# Patient Record
Sex: FEMALE | Race: WHITE | NOT HISPANIC OR LATINO | Employment: OTHER | URBAN - METROPOLITAN AREA
[De-identification: names, ages, dates, MRNs, and addresses within clinical notes are randomized per-mention and may not be internally consistent; named-entity substitution may affect disease eponyms.]

---

## 2018-04-30 ENCOUNTER — TRANSCRIBE ORDERS (OUTPATIENT)
Dept: ADMINISTRATIVE | Facility: HOSPITAL | Age: 63
End: 2018-04-30

## 2019-02-13 ENCOUNTER — HOSPITAL ENCOUNTER (OUTPATIENT)
Dept: LAB | Facility: HOSPITAL | Age: 64
Discharge: HOME | End: 2019-02-13
Attending: INTERNAL MEDICINE

## 2019-02-13 LAB
25(OH)D3 SERPL-MCNC: 40 NG/ML (ref 30–100)
ALBUMIN SERPL-MCNC: 4.1 G/DL (ref 3.5–5)
ANION GAP SERPL CALC-SCNC: 19 MMOL/L (ref 8–19)
APPEARANCE UR: CLEAR
BASOPHILS # BLD AUTO: 0.11 10*3/UL (ref 0–0.2)
BASOPHILS NFR BLD AUTO: 1 % (ref 0–3)
BILIRUB UR QL: NEGATIVE
BUN SERPL-MCNC: 23 MG/DL (ref 5–25)
BUN/CREAT SERPL: 17 {RATIO} (ref 6–20)
CALCIUM SERPL-MCNC: 10.1 MG/DL (ref 8.7–10.4)
CHLORIDE SERPL-SCNC: 102 MMOL/L (ref 99–111)
COLOR UR: YELLOW
CONV BACTERIA: ABNORMAL
CONV CO2: 23 MMOL/L (ref 22–32)
CONV COLLECTION SOURCE (UA): ABNORMAL
CONV CREATININE URINE, RANDOM: 205.5 MG/DL (ref 10–300)
CONV CRYSTALS: ABNORMAL /[HPF]
CONV HYALINE CASTS IN URINE MICRO: ABNORMAL /[LPF]
CONV UROBILINOGEN IN URINE BY AUTOMATED TEST STRIP: 1 {EHRLICHU}/DL (ref 0.1–1)
CREAT UR-MCNC: 1.38 MG/DL (ref 0.5–0.9)
EOSINOPHIL # BLD AUTO: 0.3 10*3/UL (ref 0–0.7)
EOSINOPHIL # BLD AUTO: 2.68 % (ref 0–7)
ERYTHROCYTE [DISTWIDTH] IN BLOOD BY AUTOMATED COUNT: 12.2 % (ref 11.5–14.5)
GFR SERPLBLD BASED ON 1.73 SQ M-ARVRAT: 40 ML/MIN/{1.73_M2}
GLUCOSE SERPL-MCNC: 124 MG/DL (ref 65–99)
GLUCOSE UR QL: NEGATIVE MG/DL
HBA1C MFR BLD: 14.1 G/DL (ref 12–16)
HCT VFR BLD AUTO: 40.9 % (ref 37–47)
HGB UR QL STRIP: NEGATIVE
KETONES UR QL STRIP: NEGATIVE MG/DL
LEUKOCYTE ESTERASE UR QL STRIP: NEGATIVE
LYMPHOCYTES # BLD AUTO: 4.04 10*3/UL (ref 1–5)
MCH RBC QN AUTO: 29.3 PG (ref 27–31)
MCHC RBC AUTO-ENTMCNC: 34.5 G/DL (ref 33–37)
MCV RBC AUTO: 85 FL (ref 81–99)
MONOCYTES # BLD AUTO: 0.96 10*3/UL (ref 0.2–1.2)
MONOCYTES NFR BLD AUTO: 8.74 % (ref 3–10)
NEUTROPHILS # BLD AUTO: 5.63 10*3/UL (ref 2–8)
NEUTROPHILS NFR BLD AUTO: 51 % (ref 30–85)
NITRITE UR QL STRIP: POSITIVE
NRBC BLD AUTO-RTO: 0 % (ref 0–0.01)
PH UR STRIP.AUTO: 5.5 [PH] (ref 5–8)
PHOSPHATE SERPL-MCNC: 3.7 MG/DL (ref 2.4–4.5)
PLATELET # BLD AUTO: 258 10*3/UL (ref 130–400)
PMV BLD AUTO: 6.7 FL (ref 7.4–10.4)
POTASSIUM SERPL-SCNC: 4.3 MMOL/L (ref 3.5–5.3)
PROT UR QL: NEGATIVE MG/DL
PROT UR-MCNC: 23.9 MG/DL
PTH-INTACT SERPL-MCNC: 33.9 PG/ML (ref 11.1–79.5)
RBC # BLD AUTO: 4.81 10*6/UL (ref 4.2–5.4)
RBC #/AREA URNS HPF: ABNORMAL /[HPF]
SODIUM SERPL-SCNC: 140 MMOL/L (ref 135–147)
SP GR UR: 1.02 (ref 1–1.03)
SQUAMOUS SPT QL MICRO: ABNORMAL /[HPF]
VARIANT LYMPHS NFR BLD MANUAL: 36.6 % (ref 20–45)
WBC # BLD AUTO: 11 10*3/UL (ref 4.8–10.8)
WBC #/AREA URNS HPF: ABNORMAL /[HPF]

## 2020-04-22 ENCOUNTER — HOSPITAL ENCOUNTER (OUTPATIENT)
Dept: LAB | Facility: HOSPITAL | Age: 65
Discharge: HOME | End: 2020-04-22
Attending: NURSE PRACTITIONER

## 2020-04-22 LAB
25(OH)D3 SERPL-MCNC: 32.1 NG/ML (ref 30–100)
ALBUMIN SERPL-MCNC: 4.2 G/DL (ref 3.5–5)
ANION GAP SERPL CALC-SCNC: 21 MMOL/L (ref 8–19)
APPEARANCE UR: ABNORMAL
BASOPHILS # BLD AUTO: 0.08 10*3/UL (ref 0–0.2)
BASOPHILS NFR BLD AUTO: 0.8 % (ref 0–3)
BILIRUB UR QL: NEGATIVE
BUN SERPL-MCNC: 15 MG/DL (ref 5–25)
BUN/CREAT SERPL: 11 {RATIO} (ref 6–20)
CALCIUM SERPL-MCNC: 9.6 MG/DL (ref 8.7–10.4)
CHLORIDE SERPL-SCNC: 101 MMOL/L (ref 99–111)
COLOR UR: YELLOW
CONV ABS IMM GRAN: 0.05 10*3/UL (ref 0–0.2)
CONV BACTERIA: ABNORMAL
CONV CO2: 23 MMOL/L (ref 22–32)
CONV COLLECTION SOURCE (UA): ABNORMAL
CONV CREATININE URINE, RANDOM: 247.1 MG/DL (ref 10–300)
CONV CRYSTALS: ABNORMAL /[HPF]
CONV IMMATURE GRAN: 0.5 % (ref 0–1.8)
CONV PROTEIN TO CREATININE RATIO (RANDOM URINE): 0.16 {RATIO} (ref 0–0.1)
CONV UROBILINOGEN IN URINE BY AUTOMATED TEST STRIP: 1 {EHRLICHU}/DL (ref 0.1–1)
CREAT UR-MCNC: 1.35 MG/DL (ref 0.5–0.9)
DEPRECATED RDW RBC AUTO: 43.5 FL (ref 36.4–46.3)
EOSINOPHIL # BLD AUTO: 0.37 10*3/UL (ref 0–0.7)
EOSINOPHIL # BLD AUTO: 3.5 % (ref 0–7)
ERYTHROCYTE [DISTWIDTH] IN BLOOD BY AUTOMATED COUNT: 13.2 % (ref 11.7–14.4)
GFR SERPLBLD BASED ON 1.73 SQ M-ARVRAT: 41 ML/MIN/{1.73_M2}
GLUCOSE SERPL-MCNC: 96 MG/DL (ref 65–99)
GLUCOSE UR QL: NEGATIVE MG/DL
HCT VFR BLD AUTO: 44.4 % (ref 37–47)
HGB BLD-MCNC: 14.1 G/DL (ref 12–16)
HGB UR QL STRIP: NEGATIVE
KETONES UR QL STRIP: NEGATIVE MG/DL
LEUKOCYTE ESTERASE UR QL STRIP: NEGATIVE
LYMPHOCYTES # BLD AUTO: 5.38 10*3/UL (ref 1–5)
LYMPHOCYTES NFR BLD AUTO: 51.3 % (ref 20–45)
MCH RBC QN AUTO: 28.5 PG (ref 27–31)
MCHC RBC AUTO-ENTMCNC: 31.8 G/DL (ref 33–37)
MCV RBC AUTO: 89.7 FL (ref 81–99)
MONOCYTES # BLD AUTO: 0.99 10*3/UL (ref 0.2–1.2)
MONOCYTES NFR BLD AUTO: 9.4 % (ref 3–10)
NEUTROPHILS # BLD AUTO: 3.62 10*3/UL (ref 2–8)
NEUTROPHILS NFR BLD AUTO: 34.5 % (ref 30–85)
NITRITE UR QL STRIP: NEGATIVE
NRBC CBCN: 0 % (ref 0–0.7)
PH UR STRIP.AUTO: 5 [PH] (ref 5–8)
PHOSPHATE SERPL-MCNC: 3.8 MG/DL (ref 2.4–4.5)
PLATELET # BLD AUTO: 288 10*3/UL (ref 130–400)
PMV BLD AUTO: 9.2 FL (ref 9.4–12.3)
POTASSIUM SERPL-SCNC: 3.9 MMOL/L (ref 3.5–5.3)
PROT UR QL: NEGATIVE MG/DL
PROT UR-MCNC: 38.7 MG/DL
PTH-INTACT SERPL-MCNC: 61 PG/ML (ref 11.1–79.5)
RBC # BLD AUTO: 4.95 10*6/UL (ref 4.2–5.4)
RBC #/AREA URNS HPF: ABNORMAL /[HPF]
SODIUM SERPL-SCNC: 141 MMOL/L (ref 135–147)
SP GR UR: 1.02 (ref 1–1.03)
SQUAMOUS SPT QL MICRO: ABNORMAL /[HPF]
WBC # BLD AUTO: 10.49 10*3/UL (ref 4.8–10.8)
WBC #/AREA URNS HPF: ABNORMAL /[HPF]

## 2021-03-19 ENCOUNTER — HOSPITAL ENCOUNTER (OUTPATIENT)
Dept: VACCINE CLINIC | Facility: HOSPITAL | Age: 66
Discharge: HOME | End: 2021-03-19
Attending: INTERNAL MEDICINE

## 2021-04-16 ENCOUNTER — HOSPITAL ENCOUNTER (OUTPATIENT)
Dept: VACCINE CLINIC | Facility: HOSPITAL | Age: 66
Discharge: HOME | End: 2021-04-16
Attending: INTERNAL MEDICINE

## 2021-07-27 ENCOUNTER — LAB (OUTPATIENT)
Dept: LAB | Facility: HOSPITAL | Age: 66
End: 2021-07-27

## 2021-07-27 ENCOUNTER — TRANSCRIBE ORDERS (OUTPATIENT)
Dept: LAB | Facility: HOSPITAL | Age: 66
End: 2021-07-27

## 2021-07-27 DIAGNOSIS — E55.9 VITAMIN D DEFICIENCY: ICD-10-CM

## 2021-07-27 DIAGNOSIS — N18.31 STAGE 3A CHRONIC KIDNEY DISEASE (HCC): Primary | ICD-10-CM

## 2021-07-27 DIAGNOSIS — N18.31 STAGE 3A CHRONIC KIDNEY DISEASE (HCC): ICD-10-CM

## 2021-07-27 LAB
25(OH)D3 SERPL-MCNC: 48.9 NG/ML (ref 30–100)
ALBUMIN SERPL-MCNC: 4.2 G/DL (ref 3.5–5.2)
ANION GAP SERPL CALCULATED.3IONS-SCNC: 12.4 MMOL/L (ref 5–15)
BACTERIA UR QL AUTO: ABNORMAL /HPF
BASOPHILS # BLD AUTO: 0.05 10*3/MM3 (ref 0–0.2)
BASOPHILS NFR BLD AUTO: 0.6 % (ref 0–1.5)
BILIRUB UR QL STRIP: NEGATIVE
BUN SERPL-MCNC: 16 MG/DL (ref 8–23)
BUN/CREAT SERPL: 12.1 (ref 7–25)
CALCIUM SPEC-SCNC: 9.5 MG/DL (ref 8.6–10.5)
CHLORIDE SERPL-SCNC: 104 MMOL/L (ref 98–107)
CLARITY UR: ABNORMAL
CO2 SERPL-SCNC: 21.6 MMOL/L (ref 22–29)
COLOR UR: YELLOW
CREAT SERPL-MCNC: 1.32 MG/DL (ref 0.57–1)
CREAT UR-MCNC: 252.6 MG/DL
DEPRECATED RDW RBC AUTO: 44.6 FL (ref 37–54)
EOSINOPHIL # BLD AUTO: 0.26 10*3/MM3 (ref 0–0.4)
EOSINOPHIL NFR BLD AUTO: 2.9 % (ref 0.3–6.2)
ERYTHROCYTE [DISTWIDTH] IN BLOOD BY AUTOMATED COUNT: 13.7 % (ref 12.3–15.4)
GFR SERPL CREATININE-BSD FRML MDRD: 40 ML/MIN/1.73
GLUCOSE SERPL-MCNC: 138 MG/DL (ref 65–99)
GLUCOSE UR STRIP-MCNC: NEGATIVE MG/DL
HCT VFR BLD AUTO: 41.7 % (ref 34–46.6)
HGB BLD-MCNC: 13.8 G/DL (ref 12–15.9)
HGB UR QL STRIP.AUTO: NEGATIVE
HYALINE CASTS UR QL AUTO: ABNORMAL /LPF
IMM GRANULOCYTES # BLD AUTO: 0.03 10*3/MM3 (ref 0–0.05)
IMM GRANULOCYTES NFR BLD AUTO: 0.3 % (ref 0–0.5)
KETONES UR QL STRIP: NEGATIVE
LEUKOCYTE ESTERASE UR QL STRIP.AUTO: NEGATIVE
LYMPHOCYTES # BLD AUTO: 3.76 10*3/MM3 (ref 0.7–3.1)
LYMPHOCYTES NFR BLD AUTO: 41.4 % (ref 19.6–45.3)
MCH RBC QN AUTO: 29.2 PG (ref 26.6–33)
MCHC RBC AUTO-ENTMCNC: 33.1 G/DL (ref 31.5–35.7)
MCV RBC AUTO: 88.2 FL (ref 79–97)
MONOCYTES # BLD AUTO: 0.68 10*3/MM3 (ref 0.1–0.9)
MONOCYTES NFR BLD AUTO: 7.5 % (ref 5–12)
NEUTROPHILS NFR BLD AUTO: 4.31 10*3/MM3 (ref 1.7–7)
NEUTROPHILS NFR BLD AUTO: 47.3 % (ref 42.7–76)
NITRITE UR QL STRIP: NEGATIVE
NRBC BLD AUTO-RTO: 0 /100 WBC (ref 0–0.2)
PH UR STRIP.AUTO: 6 [PH] (ref 5–8)
PHOSPHATE SERPL-MCNC: 2.8 MG/DL (ref 2.5–4.5)
PLATELET # BLD AUTO: 291 10*3/MM3 (ref 140–450)
PMV BLD AUTO: 9.5 FL (ref 6–12)
POTASSIUM SERPL-SCNC: 4.5 MMOL/L (ref 3.5–5.2)
PROT UR QL STRIP: ABNORMAL
PROT UR-MCNC: 77.5 MG/DL
PROT/CREAT UR: 0.3 MG/G{CREAT}
PTH-INTACT SERPL-MCNC: 63.2 PG/ML (ref 15–65)
RBC # BLD AUTO: 4.73 10*6/MM3 (ref 3.77–5.28)
RBC # UR: ABNORMAL /HPF
REF LAB TEST METHOD: ABNORMAL
SODIUM SERPL-SCNC: 138 MMOL/L (ref 136–145)
SP GR UR STRIP: 1.03 (ref 1–1.03)
SQUAMOUS #/AREA URNS HPF: ABNORMAL /HPF
UROBILINOGEN UR QL STRIP: ABNORMAL
WBC # BLD AUTO: 9.09 10*3/MM3 (ref 3.4–10.8)
WBC UR QL AUTO: ABNORMAL /HPF

## 2021-07-27 PROCEDURE — 82570 ASSAY OF URINE CREATININE: CPT

## 2021-07-27 PROCEDURE — 81001 URINALYSIS AUTO W/SCOPE: CPT

## 2021-07-27 PROCEDURE — 84156 ASSAY OF PROTEIN URINE: CPT

## 2021-07-27 PROCEDURE — 36415 COLL VENOUS BLD VENIPUNCTURE: CPT

## 2021-07-27 PROCEDURE — 80069 RENAL FUNCTION PANEL: CPT

## 2021-07-27 PROCEDURE — 85025 COMPLETE CBC W/AUTO DIFF WBC: CPT

## 2021-07-27 PROCEDURE — 83970 ASSAY OF PARATHORMONE: CPT

## 2021-07-27 PROCEDURE — 82306 VITAMIN D 25 HYDROXY: CPT

## 2021-08-06 ENCOUNTER — TRANSCRIBE ORDERS (OUTPATIENT)
Dept: ADMINISTRATIVE | Facility: HOSPITAL | Age: 66
End: 2021-08-06

## 2021-08-06 DIAGNOSIS — Z12.31 VISIT FOR SCREENING MAMMOGRAM: Primary | ICD-10-CM

## 2021-08-09 ENCOUNTER — HOSPITAL ENCOUNTER (OUTPATIENT)
Dept: MAMMOGRAPHY | Facility: HOSPITAL | Age: 66
Discharge: HOME | End: 2021-08-09

## 2021-08-09 ENCOUNTER — HOSPITAL ENCOUNTER (OUTPATIENT)
Dept: ULTRASOUND IMAGING | Facility: HOSPITAL | Age: 66
Discharge: HOME | End: 2021-08-09

## 2021-08-09 ENCOUNTER — TRANSCRIBE ORDERS (OUTPATIENT)
Dept: ADMINISTRATIVE | Facility: HOSPITAL | Age: 66
End: 2021-08-09

## 2021-08-09 DIAGNOSIS — Z12.31 VISIT FOR SCREENING MAMMOGRAM: ICD-10-CM

## 2021-08-09 PROCEDURE — G0279 TOMOSYNTHESIS, MAMMO: HCPCS

## 2021-08-09 PROCEDURE — 77066 DX MAMMO INCL CAD BI: CPT

## 2021-08-09 PROCEDURE — 76642 ULTRASOUND BREAST LIMITED: CPT

## 2021-08-25 ENCOUNTER — TELEPHONE (OUTPATIENT)
Dept: CARDIOLOGY | Facility: CLINIC | Age: 66
End: 2021-08-25

## 2021-08-27 NOTE — PROGRESS NOTES
RM:________    Referral Provider: No ref. provider found Christi Coppola MD    NEW PATIENT/ CONSULT  PREVIOUS CARDIOLOGIST:   CARDIAC TESTING:     : 1955   AGE: 66 y.o.    2021  REASON FOR VISIT/  CC:      WT: ____________ BP: __________L __________R/ HR_______________    CHEST PAIN: _____________    SOA: ____________PALPS: __________      LIGHTHEADED: ___________ FATIGUE: _______________ EDEMA______________  ALLERGIES:  Patient has no known allergies.  SMOKING HISTORY  Social History     Tobacco Use   • Smoking status: Not on file   Substance Use Topics   • Alcohol use: Not on file   • Drug use: Not on file          CHILDREN: _______________________       CAFFEINE USE________  ALCOHOL _____________  OCCUPATION_____________  No past surgical history on file.             FAMILY HISTORY  HEART DISEASE  CHF  DIABETES  CARDIAC ARREST  STROKE  CANCER  ANEURYSM                                                             H/O: MI_____   STROKE________   GOUT_____   ANEMIA______     CAROTID________ HIV____ CAD_______ HYPERCHOL _____    H/O: CHF _____   RF____ DM___ HTN_______PVD____THYROID DISEASE_______    PMH: GI ____   HEPATITIS ___ KIDNEY DISEASE ___ LUNG DISEASE _______     SLEEP APNEA ____ BLOOD CLOTS ____ DVT ____ VEIN STRIPPING ___________     CANCER _________________________________ CHEMO/ RADIATION__________

## 2021-08-30 ENCOUNTER — TELEPHONE (OUTPATIENT)
Dept: CARDIOLOGY | Facility: CLINIC | Age: 66
End: 2021-08-30

## 2021-08-30 ENCOUNTER — HOSPITAL ENCOUNTER (INPATIENT)
Facility: HOSPITAL | Age: 66
LOS: 1 days | Discharge: HOME | End: 2021-08-31
Attending: INTERNAL MEDICINE | Admitting: INTERNAL MEDICINE

## 2021-08-30 ENCOUNTER — OFFICE VISIT (OUTPATIENT)
Dept: CARDIOLOGY | Facility: CLINIC | Age: 66
End: 2021-08-30

## 2021-08-30 ENCOUNTER — HOSPITAL ENCOUNTER (OUTPATIENT)
Dept: CARDIOLOGY | Facility: HOSPITAL | Age: 66
Discharge: HOME | End: 2021-08-30

## 2021-08-30 VITALS
HEIGHT: 63 IN | DIASTOLIC BLOOD PRESSURE: 84 MMHG | HEART RATE: 81 BPM | BODY MASS INDEX: 47.8 KG/M2 | WEIGHT: 269.8 LBS | SYSTOLIC BLOOD PRESSURE: 130 MMHG

## 2021-08-30 DIAGNOSIS — E78.2 MIXED HYPERLIPIDEMIA: ICD-10-CM

## 2021-08-30 DIAGNOSIS — R06.09 DYSPNEA ON EXERTION: ICD-10-CM

## 2021-08-30 DIAGNOSIS — N18.32 STAGE 3B CHRONIC KIDNEY DISEASE (HCC): ICD-10-CM

## 2021-08-30 DIAGNOSIS — E11.42 TYPE 2 DIABETES MELLITUS WITH DIABETIC POLYNEUROPATHY, WITH LONG-TERM CURRENT USE OF INSULIN (HCC): ICD-10-CM

## 2021-08-30 DIAGNOSIS — R07.2 PRECORDIAL PAIN: ICD-10-CM

## 2021-08-30 DIAGNOSIS — R07.2 PRECORDIAL PAIN: Primary | ICD-10-CM

## 2021-08-30 DIAGNOSIS — I20.0 UNSTABLE ANGINA (HCC): Primary | ICD-10-CM

## 2021-08-30 DIAGNOSIS — Z79.4 TYPE 2 DIABETES MELLITUS WITH DIABETIC POLYNEUROPATHY, WITH LONG-TERM CURRENT USE OF INSULIN (HCC): ICD-10-CM

## 2021-08-30 DIAGNOSIS — E66.01 MORBID OBESITY (HCC): ICD-10-CM

## 2021-08-30 DIAGNOSIS — I10 ESSENTIAL HYPERTENSION: ICD-10-CM

## 2021-08-30 LAB
ALBUMIN SERPL-MCNC: 4.3 G/DL (ref 3.5–5.2)
ALBUMIN/GLOB SERPL: 1.4 G/DL
ALP SERPL-CCNC: 78 U/L (ref 39–117)
ALT SERPL W P-5'-P-CCNC: 16 U/L (ref 1–33)
ANION GAP SERPL CALCULATED.3IONS-SCNC: 12.2 MMOL/L (ref 5–15)
AST SERPL-CCNC: 21 U/L (ref 1–32)
BASOPHILS # BLD AUTO: 0.07 10*3/MM3 (ref 0–0.2)
BASOPHILS NFR BLD AUTO: 0.8 % (ref 0–1.5)
BILIRUB SERPL-MCNC: 0.4 MG/DL (ref 0–1.2)
BUN SERPL-MCNC: 18 MG/DL (ref 8–23)
BUN/CREAT SERPL: 14.4 (ref 7–25)
CALCIUM SPEC-SCNC: 9.5 MG/DL (ref 8.6–10.5)
CHLORIDE SERPL-SCNC: 105 MMOL/L (ref 98–107)
CO2 SERPL-SCNC: 20.8 MMOL/L (ref 22–29)
CREAT SERPL-MCNC: 1.25 MG/DL (ref 0.57–1)
DEPRECATED RDW RBC AUTO: 44.6 FL (ref 37–54)
EOSINOPHIL # BLD AUTO: 0.28 10*3/MM3 (ref 0–0.4)
EOSINOPHIL NFR BLD AUTO: 3.2 % (ref 0.3–6.2)
ERYTHROCYTE [DISTWIDTH] IN BLOOD BY AUTOMATED COUNT: 13.8 % (ref 12.3–15.4)
GFR SERPL CREATININE-BSD FRML MDRD: 43 ML/MIN/1.73
GLOBULIN UR ELPH-MCNC: 3 GM/DL
GLUCOSE BLDC GLUCOMTR-MCNC: 116 MG/DL (ref 70–130)
GLUCOSE BLDC GLUCOMTR-MCNC: 146 MG/DL (ref 70–130)
GLUCOSE SERPL-MCNC: 118 MG/DL (ref 65–99)
HCT VFR BLD AUTO: 45 % (ref 34–46.6)
HGB BLD-MCNC: 14.9 G/DL (ref 12–15.9)
IMM GRANULOCYTES # BLD AUTO: 0.03 10*3/MM3 (ref 0–0.05)
IMM GRANULOCYTES NFR BLD AUTO: 0.3 % (ref 0–0.5)
LYMPHOCYTES # BLD AUTO: 3.75 10*3/MM3 (ref 0.7–3.1)
LYMPHOCYTES NFR BLD AUTO: 42.5 % (ref 19.6–45.3)
MCH RBC QN AUTO: 29.2 PG (ref 26.6–33)
MCHC RBC AUTO-ENTMCNC: 33.1 G/DL (ref 31.5–35.7)
MCV RBC AUTO: 88.1 FL (ref 79–97)
MONOCYTES # BLD AUTO: 0.73 10*3/MM3 (ref 0.1–0.9)
MONOCYTES NFR BLD AUTO: 8.3 % (ref 5–12)
NEUTROPHILS NFR BLD AUTO: 3.96 10*3/MM3 (ref 1.7–7)
NEUTROPHILS NFR BLD AUTO: 44.9 % (ref 42.7–76)
NRBC BLD AUTO-RTO: 0.1 /100 WBC (ref 0–0.2)
PLATELET # BLD AUTO: 219 10*3/MM3 (ref 140–450)
PMV BLD AUTO: 9.4 FL (ref 6–12)
POTASSIUM SERPL-SCNC: 4.5 MMOL/L (ref 3.5–5.2)
PROT SERPL-MCNC: 7.3 G/DL (ref 6–8.5)
QT INTERVAL: 371 MS
RBC # BLD AUTO: 5.11 10*6/MM3 (ref 3.77–5.28)
SARS-COV-2 RNA RESP QL NAA+PROBE: NOT DETECTED
SODIUM SERPL-SCNC: 138 MMOL/L (ref 136–145)
WBC # BLD AUTO: 8.82 10*3/MM3 (ref 3.4–10.8)

## 2021-08-30 PROCEDURE — 25010000002 FENTANYL CITRATE (PF) 50 MCG/ML SOLUTION: Performed by: INTERNAL MEDICINE

## 2021-08-30 PROCEDURE — 80053 COMPREHEN METABOLIC PANEL: CPT | Performed by: INTERNAL MEDICINE

## 2021-08-30 PROCEDURE — 85025 COMPLETE CBC W/AUTO DIFF WBC: CPT | Performed by: INTERNAL MEDICINE

## 2021-08-30 PROCEDURE — C1769 GUIDE WIRE: HCPCS | Performed by: INTERNAL MEDICINE

## 2021-08-30 PROCEDURE — 85347 COAGULATION TIME ACTIVATED: CPT

## 2021-08-30 PROCEDURE — C1887 CATHETER, GUIDING: HCPCS | Performed by: INTERNAL MEDICINE

## 2021-08-30 PROCEDURE — C1874 STENT, COATED/COV W/DEL SYS: HCPCS | Performed by: INTERNAL MEDICINE

## 2021-08-30 PROCEDURE — C9600 PERC DRUG-EL COR STENT SING: HCPCS | Performed by: INTERNAL MEDICINE

## 2021-08-30 PROCEDURE — 99204 OFFICE O/P NEW MOD 45 MIN: CPT | Performed by: INTERNAL MEDICINE

## 2021-08-30 PROCEDURE — 92978 ENDOLUMINL IVUS OCT C 1ST: CPT | Performed by: INTERNAL MEDICINE

## 2021-08-30 PROCEDURE — C1725 CATH, TRANSLUMIN NON-LASER: HCPCS | Performed by: INTERNAL MEDICINE

## 2021-08-30 PROCEDURE — 4A023N7 MEASUREMENT OF CARDIAC SAMPLING AND PRESSURE, LEFT HEART, PERCUTANEOUS APPROACH: ICD-10-PCS | Performed by: INTERNAL MEDICINE

## 2021-08-30 PROCEDURE — 25010000002 HYDRALAZINE PER 20 MG: Performed by: INTERNAL MEDICINE

## 2021-08-30 PROCEDURE — 25010000002 HEPARIN (PORCINE) PER 1000 UNITS: Performed by: INTERNAL MEDICINE

## 2021-08-30 PROCEDURE — 93000 ELECTROCARDIOGRAM COMPLETE: CPT | Performed by: INTERNAL MEDICINE

## 2021-08-30 PROCEDURE — U0003 INFECTIOUS AGENT DETECTION BY NUCLEIC ACID (DNA OR RNA); SEVERE ACUTE RESPIRATORY SYNDROME CORONAVIRUS 2 (SARS-COV-2) (CORONAVIRUS DISEASE [COVID-19]), AMPLIFIED PROBE TECHNIQUE, MAKING USE OF HIGH THROUGHPUT TECHNOLOGIES AS DESCRIBED BY CMS-2020-01-R: HCPCS | Performed by: INTERNAL MEDICINE

## 2021-08-30 PROCEDURE — 0 IOPAMIDOL PER 1 ML: Performed by: INTERNAL MEDICINE

## 2021-08-30 PROCEDURE — 25010000002 MORPHINE PER 10 MG: Performed by: INTERNAL MEDICINE

## 2021-08-30 PROCEDURE — 25010000002 MIDAZOLAM PER 1 MG: Performed by: INTERNAL MEDICINE

## 2021-08-30 PROCEDURE — B2151ZZ FLUOROSCOPY OF LEFT HEART USING LOW OSMOLAR CONTRAST: ICD-10-PCS | Performed by: INTERNAL MEDICINE

## 2021-08-30 PROCEDURE — B221Z2Z COMPUTERIZED TOMOGRAPHY (CT SCAN) OF MULTIPLE CORONARY ARTERIES USING INTRAVASCULAR OPTICAL COHERENCE: ICD-10-PCS | Performed by: INTERNAL MEDICINE

## 2021-08-30 PROCEDURE — C1753 CATH, INTRAVAS ULTRASOUND: HCPCS | Performed by: INTERNAL MEDICINE

## 2021-08-30 PROCEDURE — 93458 L HRT ARTERY/VENTRICLE ANGIO: CPT | Performed by: INTERNAL MEDICINE

## 2021-08-30 PROCEDURE — 93010 ELECTROCARDIOGRAM REPORT: CPT | Performed by: INTERNAL MEDICINE

## 2021-08-30 PROCEDURE — 82962 GLUCOSE BLOOD TEST: CPT

## 2021-08-30 PROCEDURE — 36415 COLL VENOUS BLD VENIPUNCTURE: CPT

## 2021-08-30 PROCEDURE — 92928 PRQ TCAT PLMT NTRAC ST 1 LES: CPT | Performed by: INTERNAL MEDICINE

## 2021-08-30 PROCEDURE — B2111ZZ FLUOROSCOPY OF MULTIPLE CORONARY ARTERIES USING LOW OSMOLAR CONTRAST: ICD-10-PCS | Performed by: INTERNAL MEDICINE

## 2021-08-30 PROCEDURE — 027034Z DILATION OF CORONARY ARTERY, ONE ARTERY WITH DRUG-ELUTING INTRALUMINAL DEVICE, PERCUTANEOUS APPROACH: ICD-10-PCS | Performed by: INTERNAL MEDICINE

## 2021-08-30 PROCEDURE — C1894 INTRO/SHEATH, NON-LASER: HCPCS | Performed by: INTERNAL MEDICINE

## 2021-08-30 PROCEDURE — 93005 ELECTROCARDIOGRAM TRACING: CPT | Performed by: INTERNAL MEDICINE

## 2021-08-30 DEVICE — XIENCE SIERRA™ EVEROLIMUS ELUTING CORONARY STENT SYSTEM 3.00 MM X 18 MM / RAPID-EXCHANGE
Type: IMPLANTABLE DEVICE | Status: FUNCTIONAL
Brand: XIENCE SIERRA™

## 2021-08-30 RX ORDER — LOSARTAN POTASSIUM 50 MG/1
TABLET ORAL
Status: ON HOLD | COMMUNITY
Start: 2021-06-14 | End: 2021-08-31 | Stop reason: SDUPTHER

## 2021-08-30 RX ORDER — HYDRALAZINE HYDROCHLORIDE 20 MG/ML
10 INJECTION INTRAMUSCULAR; INTRAVENOUS ONCE
Status: COMPLETED | OUTPATIENT
Start: 2021-08-30 | End: 2021-08-30

## 2021-08-30 RX ORDER — FENTANYL CITRATE 50 UG/ML
50 INJECTION, SOLUTION INTRAMUSCULAR; INTRAVENOUS ONCE
Status: COMPLETED | OUTPATIENT
Start: 2021-08-30 | End: 2021-08-30

## 2021-08-30 RX ORDER — ASPIRIN 81 MG/1
81 TABLET ORAL DAILY
Status: DISCONTINUED | OUTPATIENT
Start: 2021-08-30 | End: 2021-08-31 | Stop reason: HOSPADM

## 2021-08-30 RX ORDER — LOSARTAN POTASSIUM 25 MG/1
25 TABLET ORAL
Status: DISCONTINUED | OUTPATIENT
Start: 2021-08-31 | End: 2021-08-31 | Stop reason: HOSPADM

## 2021-08-30 RX ORDER — SODIUM CHLORIDE 9 MG/ML
125 INJECTION, SOLUTION INTRAVENOUS CONTINUOUS
Status: ACTIVE | OUTPATIENT
Start: 2021-08-30 | End: 2021-08-31

## 2021-08-30 RX ORDER — HYDROCODONE BITARTRATE AND ACETAMINOPHEN 5; 325 MG/1; MG/1
1 TABLET ORAL EVERY 4 HOURS PRN
Status: DISCONTINUED | OUTPATIENT
Start: 2021-08-30 | End: 2021-08-31 | Stop reason: HOSPADM

## 2021-08-30 RX ORDER — SODIUM CHLORIDE 0.9 % (FLUSH) 0.9 %
3 SYRINGE (ML) INJECTION EVERY 12 HOURS SCHEDULED
Status: DISCONTINUED | OUTPATIENT
Start: 2021-08-30 | End: 2021-08-30 | Stop reason: HOSPADM

## 2021-08-30 RX ORDER — PRASUGREL 10 MG/1
10 TABLET, FILM COATED ORAL DAILY
Status: DISCONTINUED | OUTPATIENT
Start: 2021-08-31 | End: 2021-08-31 | Stop reason: HOSPADM

## 2021-08-30 RX ORDER — ONDANSETRON 2 MG/ML
4 INJECTION INTRAMUSCULAR; INTRAVENOUS EVERY 6 HOURS PRN
Status: DISCONTINUED | OUTPATIENT
Start: 2021-08-30 | End: 2021-08-31 | Stop reason: HOSPADM

## 2021-08-30 RX ORDER — LIDOCAINE HYDROCHLORIDE 20 MG/ML
INJECTION, SOLUTION INFILTRATION; PERINEURAL AS NEEDED
Status: DISCONTINUED | OUTPATIENT
Start: 2021-08-30 | End: 2021-08-30 | Stop reason: HOSPADM

## 2021-08-30 RX ORDER — METOPROLOL TARTRATE 5 MG/5ML
INJECTION INTRAVENOUS AS NEEDED
Status: DISCONTINUED | OUTPATIENT
Start: 2021-08-30 | End: 2021-08-30 | Stop reason: HOSPADM

## 2021-08-30 RX ORDER — PRASUGREL 10 MG/1
TABLET, FILM COATED ORAL AS NEEDED
Status: DISCONTINUED | OUTPATIENT
Start: 2021-08-30 | End: 2021-08-30 | Stop reason: HOSPADM

## 2021-08-30 RX ORDER — ONDANSETRON 4 MG/1
4 TABLET, FILM COATED ORAL EVERY 6 HOURS PRN
Status: DISCONTINUED | OUTPATIENT
Start: 2021-08-30 | End: 2021-08-31 | Stop reason: HOSPADM

## 2021-08-30 RX ORDER — MORPHINE SULFATE 2 MG/ML
1 INJECTION, SOLUTION INTRAMUSCULAR; INTRAVENOUS EVERY 4 HOURS PRN
Status: DISCONTINUED | OUTPATIENT
Start: 2021-08-30 | End: 2021-08-31 | Stop reason: HOSPADM

## 2021-08-30 RX ORDER — DULOXETIN HYDROCHLORIDE 60 MG/1
CAPSULE, DELAYED RELEASE ORAL DAILY
COMMUNITY
Start: 2021-08-03

## 2021-08-30 RX ORDER — NALOXONE HCL 0.4 MG/ML
0.4 VIAL (ML) INJECTION
Status: DISCONTINUED | OUTPATIENT
Start: 2021-08-30 | End: 2021-08-31 | Stop reason: HOSPADM

## 2021-08-30 RX ORDER — FAMOTIDINE 20 MG/1
20 TABLET, FILM COATED ORAL
COMMUNITY

## 2021-08-30 RX ORDER — FENTANYL CITRATE 50 UG/ML
INJECTION, SOLUTION INTRAMUSCULAR; INTRAVENOUS AS NEEDED
Status: DISCONTINUED | OUTPATIENT
Start: 2021-08-30 | End: 2021-08-30 | Stop reason: HOSPADM

## 2021-08-30 RX ORDER — MIDAZOLAM HYDROCHLORIDE 1 MG/ML
INJECTION INTRAMUSCULAR; INTRAVENOUS AS NEEDED
Status: DISCONTINUED | OUTPATIENT
Start: 2021-08-30 | End: 2021-08-30 | Stop reason: HOSPADM

## 2021-08-30 RX ORDER — SODIUM CHLORIDE 9 MG/ML
75 INJECTION, SOLUTION INTRAVENOUS CONTINUOUS
Status: DISCONTINUED | OUTPATIENT
Start: 2021-08-30 | End: 2021-08-31 | Stop reason: HOSPADM

## 2021-08-30 RX ORDER — TEMAZEPAM 15 MG/1
15 CAPSULE ORAL NIGHTLY PRN
Status: DISCONTINUED | OUTPATIENT
Start: 2021-08-30 | End: 2021-08-31 | Stop reason: HOSPADM

## 2021-08-30 RX ORDER — SODIUM CHLORIDE 0.9 % (FLUSH) 0.9 %
10 SYRINGE (ML) INJECTION AS NEEDED
Status: DISCONTINUED | OUTPATIENT
Start: 2021-08-30 | End: 2021-08-30 | Stop reason: HOSPADM

## 2021-08-30 RX ORDER — INSULIN LISPRO 100 [IU]/ML
INJECTION, SUSPENSION SUBCUTANEOUS
COMMUNITY
Start: 2021-08-05

## 2021-08-30 RX ORDER — ERGOCALCIFEROL 1.25 MG/1
50000 CAPSULE ORAL WEEKLY
COMMUNITY
Start: 2021-08-15

## 2021-08-30 RX ORDER — HEPARIN SODIUM 1000 [USP'U]/ML
INJECTION, SOLUTION INTRAVENOUS; SUBCUTANEOUS AS NEEDED
Status: DISCONTINUED | OUTPATIENT
Start: 2021-08-30 | End: 2021-08-30 | Stop reason: HOSPADM

## 2021-08-30 RX ORDER — ASPIRIN 325 MG
TABLET ORAL AS NEEDED
Status: DISCONTINUED | OUTPATIENT
Start: 2021-08-30 | End: 2021-08-30 | Stop reason: HOSPADM

## 2021-08-30 RX ORDER — DEXTROSE MONOHYDRATE 25 G/50ML
25 INJECTION, SOLUTION INTRAVENOUS
Status: DISCONTINUED | OUTPATIENT
Start: 2021-08-30 | End: 2021-08-31 | Stop reason: HOSPADM

## 2021-08-30 RX ORDER — NICOTINE POLACRILEX 4 MG
15 LOZENGE BUCCAL
Status: DISCONTINUED | OUTPATIENT
Start: 2021-08-30 | End: 2021-08-31 | Stop reason: HOSPADM

## 2021-08-30 RX ORDER — DULOXETIN HYDROCHLORIDE 60 MG/1
60 CAPSULE, DELAYED RELEASE ORAL DAILY
Status: DISCONTINUED | OUTPATIENT
Start: 2021-08-31 | End: 2021-08-31 | Stop reason: HOSPADM

## 2021-08-30 RX ORDER — ERGOCALCIFEROL 1.25 MG/1
50000 CAPSULE ORAL WEEKLY
Status: DISCONTINUED | OUTPATIENT
Start: 2021-09-01 | End: 2021-08-31 | Stop reason: HOSPADM

## 2021-08-30 RX ADMIN — SODIUM CHLORIDE 75 ML/HR: 9 INJECTION, SOLUTION INTRAVENOUS at 13:16

## 2021-08-30 RX ADMIN — MORPHINE SULFATE 1 MG: 2 INJECTION, SOLUTION INTRAMUSCULAR; INTRAVENOUS at 15:17

## 2021-08-30 RX ADMIN — FENTANYL CITRATE 50 MCG: 50 INJECTION, SOLUTION INTRAMUSCULAR; INTRAVENOUS at 15:35

## 2021-08-30 RX ADMIN — METOPROLOL TARTRATE 25 MG: 25 TABLET, FILM COATED ORAL at 19:23

## 2021-08-30 RX ADMIN — HYDRALAZINE HYDROCHLORIDE 10 MG: 20 INJECTION INTRAMUSCULAR; INTRAVENOUS at 15:34

## 2021-08-30 RX ADMIN — MORPHINE SULFATE 1 MG: 2 INJECTION, SOLUTION INTRAMUSCULAR; INTRAVENOUS at 19:23

## 2021-08-30 NOTE — TELEPHONE ENCOUNTER
Dr Bunch Stat Cath for today 8/30 went to [facility].  I gave them your number just in case they want to do a peer to peer.    Thank you    Tara ASH

## 2021-08-30 NOTE — PROGRESS NOTES
Date of Office Visit: 21  Encounter Provider: Johan Bunch MD  Place of Service: [facility] CARDIOLOGY  Patient Name: Idalia Okeefe  :1955    Chief Complaint   Patient presents with   • Chest Pain   :     HPI:     Ms. Okeefe is 66 y.o. and presents today at the request of Dr Coppola.    She is a long history of insulin-dependent type 2 diabetes.  It has not been well controlled in the past, but lately, it has been.  Her most recent A1c was 6.8%.  She has a history of acute renal failure which briefly required hemodialysis in the past.  She has a left upper extremity AV fistula that she has never had to use.  She has stage III chronic renal insufficiency, with a serum creatinine of 1.3-1.4.    She has hypertension.  She has hyperlipidemia and states that she has been on several different statins, which caused severe myalgias.    She is a former smoker, having quit in .  Her mother had valvular heart disease and required open heart surgery for a valve replacement.  Her father had his first MI at age 45.  2 of her 3 brothers have been diagnosed with heart disease in their early 60s.    She has been having exertional chest discomfort that radiates to her back, associated with nausea, diaphoresis, and marked shortness of breath.  She will occasionally have it at rest, but it is consistently occurring with all levels of exertion.  She has not passed out.  She denies lower extremity edema.  She denies orthopnea.  She denies palpitations.  She has never been tested for sleep apnea.    She reports allergies to aspirin and contrast dye.  Please note, she does not have allergies to these, and merely avoids them because of her chronic renal insufficiency.    Past Medical History:   Diagnosis Date   • Acquired solitary kidney    • Acute renal failure (ARF) (CMS/HCC)     requiring temporary dialysis   • AV fistula (CMS/HCC)     LUE, never has been used   • CKD (chronic kidney  disease) stage 3, GFR 30-59 ml/min (CMS/HCC)    • Diabetic neuropathy (CMS/HCC)    • Hypertension    • Morbid obesity (CMS/HCC)    • Type 2 diabetes mellitus (CMS/HCC)    • Vitamin D deficiency        Past Surgical History:   Procedure Laterality Date   • HYSTERECTOMY     • NEPHRECTOMY PARTIAL Left    • TONSILLECTOMY AND ADENOIDECTOMY         Social History     Socioeconomic History   • Marital status:      Spouse name: Not on file   • Number of children: Not on file   • Years of education: Not on file   • Highest education level: Not on file   Tobacco Use   • Smoking status: Former Smoker     Quit date: 2001     Years since quittin.0   • Smokeless tobacco: Never Used   Vaping Use   • Vaping Use: Never used   Substance and Sexual Activity   • Alcohol use: Never   • Drug use: Never       Family History   Problem Relation Age of Onset   • Colon cancer Mother    • Heart valve disorder Mother         had a valve replacement   • Coronary artery disease Father         first MI age 45   • Coronary artery disease Brother         CABG in his early 60s   • Coronary artery disease Brother         PCIs in 60s       Review of Systems   Constitutional: Positive for malaise/fatigue and weight gain.   Cardiovascular: Positive for chest pain and dyspnea on exertion.   Musculoskeletal: Positive for back pain, joint pain and myalgias.   Neurological: Positive for excessive daytime sleepiness, numbness and paresthesias.   Psychiatric/Behavioral: Positive for depression.   All other systems reviewed and are negative.      Allergies   Allergen Reactions   • Barium-Containing Compounds Other (See Comments)     Low kidney function   • Contrast Dye Other (See Comments)     Pt has kidney issues.    • Levofloxacin Hives   • Vancomycin Rash         Current Outpatient Medications:   •  DULoxetine (CYMBALTA) 60 MG capsule, Daily., Disp: , Rfl:   •  famotidine (PEPCID) 20 MG tablet, Take 20 mg by mouth., Disp: , Rfl:   •  Insulin  "Lispro Prot & Lispro (HumaLOG Mix 75/25 KwikPen) (75-25) 100 UNIT/ML suspension pen-injector pen, INJECT 50 UNITS UNDER THE SKIN TWICE A DAY BEFORE MEALS, Disp: , Rfl:   •  losartan (COZAAR) 50 MG tablet, TAKE 1 2 (ONE HALF) TABLET BY MOUTH TWICE DAILY, Disp: , Rfl:   •  metoprolol tartrate (LOPRESSOR) 25 MG tablet, TAKE 1 2 (ONE HALF) TABLET BY MOUTH TWICE DAILY, Disp: , Rfl:   •  vitamin D (ERGOCALCIFEROL) 1.25 MG (34921 UT) capsule capsule, Take 50,000 Units by mouth 1 (One) Time Per Week., Disp: , Rfl:       Objective:     Vitals:    08/30/21 1127   BP: 130/84   BP Location: Right arm   Pulse: 81   Weight: 122 kg (269 lb 12.8 oz)   Height: 160 cm (63\")     Body mass index is 47.79 kg/m².    Vitals reviewed.   Constitutional:       Appearance: Not in distress.      Comments: Obese   Eyes:      Conjunctiva/sclera: Conjunctivae normal.   HENT:      Head: Normocephalic.      Nose: Nose normal.         Comments: Masked  Neck:      Comments: Cannot assess for JVD due to habitus  Pulmonary:      Effort: Pulmonary effort is normal.   Cardiovascular:      Normal rate. Regular rhythm.      Murmurs: There is no murmur.   Pulses:     Intact distal pulses.   Edema:     Peripheral edema absent.   Abdominal:      Palpations: Abdomen is soft.      Comments: Obesity limits abdominal exam   Musculoskeletal: Normal range of motion.      Cervical back: Normal range of motion. Skin:     General: Skin is warm and dry.      Findings: No rash.   Neurological:      General: No focal deficit present.      Mental Status: Alert and oriented to person, place, and time.      Cranial Nerves: No cranial nerve deficit.   Psychiatric:         Behavior: Behavior normal.         Thought Content: Thought content normal.         Judgment: Judgment normal.           ECG 12 Lead    Date/Time: 8/30/2021 11:58 AM  Performed by: Johan Bunch MD  Authorized by: Johan Bunch MD   Comparison: compared with previous ECG   Similar to previous ECG  Rhythm: " sinus rhythm  Conduction: conduction normal  T Waves: T waves normal  QRS axis: normal  Other: no other findings    Clinical impression: non-specific ECG  Comments: Inferior leads -- MD depression and upsloping ST elevation            Assessment:       Diagnosis Plan   1. Precordial pain     2. Dyspnea on exertion     3. Type 2 diabetes mellitus with diabetic polyneuropathy, with long-term current use of insulin (CMS/[facility])     4. Mixed hyperlipidemia     5. Essential hypertension     6. Stage 3b chronic kidney disease (CMS/[facility])        Plan:       Mrs Okeefe is at extremely high risk of having heart disease, and is describing symptoms that are extremely worrisome.  When I was speaking with her and her , I was explaining why I felt that we should proceed directly with coronary angiography.  We talked about the procedure, and the possible things that we could find, and the different management strategies.  She started developing chest pressure during our conversation.  That reason, I have recommended that she go relatively urgent coronary angiography.  We were able to get some baseline labs, although the results are not back yet.  I did call and speak with her nephrologist, Dr. Horner, who verified that she can receive contrast dye in the usual manner in which we treat our CKD patients, and that she can take low-dose aspirin.    If she is indeed diagnosed with heart disease, we will likely also need to try to get a PCSK9 inhibitor on board.  Her last lipid panel showed an HDL of 47, and LDL of 140.    Sincerely,       Johan Bunch MD

## 2021-08-31 VITALS
SYSTOLIC BLOOD PRESSURE: 169 MMHG | BODY MASS INDEX: 47.66 KG/M2 | WEIGHT: 269 LBS | DIASTOLIC BLOOD PRESSURE: 71 MMHG | HEART RATE: 91 BPM | TEMPERATURE: 98 F | RESPIRATION RATE: 16 BRPM | OXYGEN SATURATION: 95 % | HEIGHT: 63 IN

## 2021-08-31 LAB
ACT BLD: 301 SECONDS (ref 82–152)
ANION GAP SERPL CALCULATED.3IONS-SCNC: 11.6 MMOL/L (ref 5–15)
BUN SERPL-MCNC: 19 MG/DL (ref 8–23)
BUN/CREAT SERPL: 13.9 (ref 7–25)
CALCIUM SPEC-SCNC: 9.2 MG/DL (ref 8.6–10.5)
CHLORIDE SERPL-SCNC: 107 MMOL/L (ref 98–107)
CO2 SERPL-SCNC: 20.4 MMOL/L (ref 22–29)
CREAT SERPL-MCNC: 1.37 MG/DL (ref 0.57–1)
DEPRECATED RDW RBC AUTO: 41.9 FL (ref 37–54)
ERYTHROCYTE [DISTWIDTH] IN BLOOD BY AUTOMATED COUNT: 13.3 % (ref 12.3–15.4)
GFR SERPL CREATININE-BSD FRML MDRD: 38 ML/MIN/1.73
GLUCOSE BLDC GLUCOMTR-MCNC: 192 MG/DL (ref 70–130)
GLUCOSE BLDC GLUCOMTR-MCNC: 205 MG/DL (ref 70–130)
GLUCOSE SERPL-MCNC: 189 MG/DL (ref 65–99)
HCT VFR BLD AUTO: 38.3 % (ref 34–46.6)
HGB BLD-MCNC: 12.7 G/DL (ref 12–15.9)
MCH RBC QN AUTO: 28.6 PG (ref 26.6–33)
MCHC RBC AUTO-ENTMCNC: 33.2 G/DL (ref 31.5–35.7)
MCV RBC AUTO: 86.3 FL (ref 79–97)
PLATELET # BLD AUTO: 219 10*3/MM3 (ref 140–450)
PMV BLD AUTO: 9.6 FL (ref 6–12)
POTASSIUM SERPL-SCNC: 4.1 MMOL/L (ref 3.5–5.2)
RBC # BLD AUTO: 4.44 10*6/MM3 (ref 3.77–5.28)
SODIUM SERPL-SCNC: 139 MMOL/L (ref 136–145)
WBC # BLD AUTO: 9.03 10*3/MM3 (ref 3.4–10.8)

## 2021-08-31 PROCEDURE — 82962 GLUCOSE BLOOD TEST: CPT

## 2021-08-31 PROCEDURE — 85027 COMPLETE CBC AUTOMATED: CPT | Performed by: INTERNAL MEDICINE

## 2021-08-31 PROCEDURE — 63710000001 INSULIN REGULAR HUMAN PER 5 UNITS: Performed by: INTERNAL MEDICINE

## 2021-08-31 PROCEDURE — 80048 BASIC METABOLIC PNL TOTAL CA: CPT | Performed by: INTERNAL MEDICINE

## 2021-08-31 RX ORDER — NITROGLYCERIN 0.4 MG/1
TABLET SUBLINGUAL
Qty: 25 TABLET | Refills: 1 | Status: SHIPPED | OUTPATIENT
Start: 2021-08-31 | End: 2022-11-30 | Stop reason: SDUPTHER

## 2021-08-31 RX ORDER — LOSARTAN POTASSIUM 50 MG/1
50 TABLET ORAL DAILY
Qty: 30 TABLET | Refills: 11 | Status: SHIPPED | OUTPATIENT
Start: 2021-08-31 | End: 2021-09-08 | Stop reason: SDUPTHER

## 2021-08-31 RX ORDER — PRASUGREL 10 MG/1
10 TABLET, FILM COATED ORAL DAILY
Qty: 30 TABLET | Refills: 11 | Status: SHIPPED | OUTPATIENT
Start: 2021-09-01 | End: 2022-08-29

## 2021-08-31 RX ORDER — ASPIRIN 81 MG/1
81 TABLET ORAL DAILY
Qty: 30 TABLET | Refills: 11 | Status: SHIPPED | OUTPATIENT
Start: 2021-09-01

## 2021-08-31 RX ADMIN — HYDROCODONE BITARTRATE AND ACETAMINOPHEN 1 TABLET: 5; 325 TABLET ORAL at 00:37

## 2021-08-31 RX ADMIN — ASPIRIN 81 MG: 81 TABLET, COATED ORAL at 08:35

## 2021-08-31 RX ADMIN — INSULIN HUMAN 3 UNITS: 100 INJECTION, SOLUTION PARENTERAL at 00:41

## 2021-08-31 RX ADMIN — PRASUGREL 10 MG: 10 TABLET, FILM COATED ORAL at 08:20

## 2021-08-31 RX ADMIN — INSULIN HUMAN 5 UNITS: 100 INJECTION, SOLUTION PARENTERAL at 06:39

## 2021-08-31 RX ADMIN — LOSARTAN POTASSIUM 25 MG: 25 TABLET, FILM COATED ORAL at 08:20

## 2021-08-31 RX ADMIN — METOPROLOL TARTRATE 25 MG: 25 TABLET, FILM COATED ORAL at 08:20

## 2021-08-31 RX ADMIN — DULOXETINE HYDROCHLORIDE 60 MG: 60 CAPSULE, DELAYED RELEASE ORAL at 08:20

## 2021-09-01 ENCOUNTER — READMISSION MANAGEMENT (OUTPATIENT)
Dept: CALL CENTER | Facility: HOSPITAL | Age: 66
End: 2021-09-01

## 2021-09-01 NOTE — OUTREACH NOTE
Prep Survey      Responses   [facility] facility patient discharged from?  [place]   Is LACE score < 7 ?  No   Emergency Room discharge w/ pulse ox?  No   Eligibility  Readm Mgmt   Discharge diagnosis  Unstable angina (CMS/HCC)   Does the patient have one of the following disease processes/diagnoses(primary or secondary)?  Other   Does the patient have Home health ordered?  No   Is there a DME ordered?  No   Prep survey completed?  Yes          Veda Regalado RN

## 2021-09-01 NOTE — DISCHARGE SUMMARY
Patient Name: Idalia Okeefe  :1955  66 y.o.    Date of Admit: 2021  Date of Discharge:  2021    Discharge Diagnosis:  Problems Addressed this Visit        Cardiac and Vasculature    Precordial pain    Relevant Orders    Cardiac Catheterization/Vascular Study (Completed)    Ambulatory Referral to Cardiac Rehab    Dyspnea on exertion    Relevant Orders    Cardiac Catheterization/Vascular Study (Completed)    Ambulatory Referral to Cardiac Rehab    Unstable angina (CMS/[facility]) - Primary    Relevant Medications    prasugrel (EFFIENT) 10 MG tablet    nitroglycerin (NITROSTAT) 0.4 MG SL tablet    Other Relevant Orders    Ambulatory Referral to Cardiac Rehab       Endocrine and Metabolic    Morbid obesity (CMS/[facility])    Relevant Orders    Ambulatory Referral to Cardiac Rehab    Type 2 diabetes mellitus (CMS/[facility])    Relevant Orders    Ambulatory Referral to Cardiac Rehab       Genitourinary and Reproductive     CKD (chronic kidney disease) stage 3, GFR 30-59 ml/min (CMS/[facility])    Relevant Orders    Ambulatory Referral to Cardiac Rehab      Diagnoses       Codes Comments    Unstable angina (CMS/HCC)    -  Primary ICD-10-CM: I20.0  ICD-9-CM: 411.1     Precordial pain     ICD-10-CM: R07.2  ICD-9-CM: 786.51     Dyspnea on exertion     ICD-10-CM: R06.00  ICD-9-CM: 786.09     Type 2 diabetes mellitus with diabetic polyneuropathy, with long-term current use of insulin (CMS/HCC)     ICD-10-CM: E11.42, Z79.4  ICD-9-CM: 250.60, 357.2, V58.67     Stage 3b chronic kidney disease (CMS/[facility])     ICD-10-CM: N18.32  ICD-9-CM: 585.3     Morbid obesity (CMS/[facility])     ICD-10-CM: E66.01  ICD-9-CM: 278.01         1.  Unstable angina  2.  Coronary disease status post drug-eluting stent to the LAD  3.  Stage IIIb chronic kidney disease  4.  Type 2 diabetes mellitus with diabetic neuropathy  5.  Morbid obesity    Hospital Course:   The patient is a 66-year-old female who was referred to Dr. Bunch for symptoms of unstable angina with high  risk of coronary disease.  We recommended coronary angiography due to symptoms.  Coronary angiography performed by Dr. Woody showed a small left main with dampening of the catheter, 90% proximal LAD mid to distal diagonal and LAD free of disease, ramus was not present, circumflex had a 40 to 50% ostial OM lesion, right coronary with proximal 20% mid 30% and mid to distal 50%.  Patient underwent a 3.0 x 18 mm Xience Carmen drug-eluting stent.  Patient was loaded with Effient 60 mg and started on 10 mg a day in addition to 81 mg aspirin.  She had no postprocedure complications and has done well.  I discussed with her the need for dual antiplatelet therapy uninterrupted for a year.  We also went over her instructions and restrictions.  She is stable and we discharged home today to follow-up in 1 week.    Procedures Performed  Procedure(s):  Coronary angiography  Left heart cath NO LV GRAM  Stent NEDA coronary  Optical Coherent Tomography       Consults     No orders found from 8/1/2021 to 8/31/2021.          Pertinent Test Results:   Results from last 7 days   Lab Units 08/31/21  0311 08/30/21  1232 08/30/21  1232   SODIUM mmol/L 139   < > 138   POTASSIUM mmol/L 4.1   < > 4.5   CHLORIDE mmol/L 107   < > 105   CO2 mmol/L 20.4*   < > 20.8*   BUN mg/dL 19   < > 18   CREATININE mg/dL 1.37*   < > 1.25*   CALCIUM mg/dL 9.2   < > 9.5   BILIRUBIN mg/dL  --   --  0.4   ALK PHOS U/L  --   --  78   ALT (SGPT) U/L  --   --  16   AST (SGOT) U/L  --   --  21   GLUCOSE mg/dL 189*   < > 118*    < > = values in this interval not displayed.         @LABRCNT(bnp)@  Results from last 7 days   Lab Units 08/31/21  0311   WBC 10*3/mm3 9.03   HEMOGLOBIN g/dL 12.7   HEMATOCRIT % 38.3   PLATELETS 10*3/mm3 219                   Condition on Discharge: stable    Discharge Medications     Discharge Medications      New Medications      Instructions Start Date   aspirin 81 MG EC tablet   81 mg, Oral, Daily      nitroglycerin 0.4 MG SL  tablet  Commonly known as: NITROSTAT   1 under the tongue as needed for angina, may repeat q5mins for up three doses      prasugrel 10 MG tablet  Commonly known as: EFFIENT   10 mg, Oral, Daily         Changes to Medications      Instructions Start Date   losartan 50 MG tablet  Commonly known as: COZAAR  What changed: See the new instructions.   50 mg, Oral, Daily         Continue These Medications      Instructions Start Date   DULoxetine 60 MG capsule  Commonly known as: CYMBALTA   Daily      famotidine 20 MG tablet  Commonly known as: PEPCID   20 mg, Oral      HumaLOG Mix 75/25 KwikPen (75-25) 100 UNIT/ML suspension pen-injector pen  Generic drug: Insulin Lispro Prot & Lispro   INJECT 50 UNITS UNDER THE SKIN TWICE A DAY BEFORE MEALS      metoprolol tartrate 25 MG tablet  Commonly known as: LOPRESSOR   TAKE 1 2 (ONE HALF) TABLET BY MOUTH TWICE DAILY      vitamin D 1.25 MG (75420 UT) capsule capsule  Commonly known as: ERGOCALCIFEROL   50,000 Units, Oral, Weekly             Discharge Diet:     Activity at Discharge:     Discharge disposition: home    Follow-up Appointments  No future appointments.  Additional Instructions for the Follow-ups that You Need to Schedule     Ambulatory Referral to Cardiac Rehab   As directed            Test Results Pending at Discharge       AUTUMN Lara, [facility]  09/01/21  13:31 EDT    Time: Discharge 35 min  Electronically signed by AUTUMN Lara, 09/01/21, 1:31 PM EDT.

## 2021-09-06 ENCOUNTER — READMISSION MANAGEMENT (OUTPATIENT)
Dept: CALL CENTER | Facility: HOSPITAL | Age: 66
End: 2021-09-06

## 2021-09-06 NOTE — OUTREACH NOTE
Medical Week 1 Survey      Responses   [facility] patient discharged from?  [place]   Does the patient have one of the following disease processes/diagnoses(primary or secondary)?  Other   Week 1 attempt successful?  No   Unsuccessful attempts  Attempt 1          Audrey Wooten LPN

## 2021-09-08 ENCOUNTER — OFFICE VISIT (OUTPATIENT)
Dept: CARDIOLOGY | Facility: CLINIC | Age: 66
End: 2021-09-08

## 2021-09-08 VITALS
HEART RATE: 84 BPM | BODY MASS INDEX: 47.84 KG/M2 | SYSTOLIC BLOOD PRESSURE: 136 MMHG | DIASTOLIC BLOOD PRESSURE: 86 MMHG | HEIGHT: 63 IN | WEIGHT: 270 LBS

## 2021-09-08 DIAGNOSIS — I10 ESSENTIAL HYPERTENSION: ICD-10-CM

## 2021-09-08 DIAGNOSIS — R06.09 DYSPNEA ON EXERTION: ICD-10-CM

## 2021-09-08 DIAGNOSIS — I25.10 CORONARY ARTERY DISEASE INVOLVING NATIVE CORONARY ARTERY OF NATIVE HEART WITHOUT ANGINA PECTORIS: Primary | ICD-10-CM

## 2021-09-08 DIAGNOSIS — R53.83 OTHER FATIGUE: ICD-10-CM

## 2021-09-08 DIAGNOSIS — E66.01 MORBID OBESITY (HCC): ICD-10-CM

## 2021-09-08 DIAGNOSIS — N18.32 STAGE 3B CHRONIC KIDNEY DISEASE (HCC): ICD-10-CM

## 2021-09-08 PROBLEM — R07.2 PRECORDIAL PAIN: Status: RESOLVED | Noted: 2021-08-30 | Resolved: 2021-09-08

## 2021-09-08 PROBLEM — I20.0 UNSTABLE ANGINA: Status: RESOLVED | Noted: 2021-08-30 | Resolved: 2021-09-08

## 2021-09-08 PROCEDURE — 99214 OFFICE O/P EST MOD 30 MIN: CPT | Performed by: NURSE PRACTITIONER

## 2021-09-08 PROCEDURE — 93000 ELECTROCARDIOGRAM COMPLETE: CPT | Performed by: NURSE PRACTITIONER

## 2021-09-08 RX ORDER — LOSARTAN POTASSIUM 50 MG/1
100 TABLET ORAL DAILY
Qty: 60 TABLET | Refills: 0
Start: 2021-09-08

## 2021-09-08 NOTE — PROGRESS NOTES
Date of Office Visit: 2021  Encounter Provider: AUTUMN Phillips  Place of Service: [facility] CARDIOLOGY  Patient Name: Idalia Okeefe  :1955  Primary Cardiologist: Dr. Johan Bunch     Chief Complaint   Patient presents with   • Coronary Artery Disease   • Hospital Follow Up Visit   :     HPI: Idalia Okeefe is a pleasant 66 y.o. female who presents today for follow-up on recent hospitalization and new diagnosis of coronary artery disease.  She is a new patient to me and I reviewed her medical records.    She has been diagnosed with type 2 diabetes, stage III chronic kidney disease, hypertension, hyperlipidemia, and obesity.  She has tried several different statins and developed severe myalgias.  She is a former cigarette smoker and quit in .  She reported that her mother had valvular heart disease requiring open heart surgery for valve replacement.  Her father had his first myocardial infarction at age 45 and 2 out of her 3 brothers have been diagnosed with heart disease in her early 60s.     She recently was evaluated by Dr. Johan Bunch for symptoms of unstable angina and (exertional chest discomfort radiating to her back associated with nausea, diaphoresis, and shortness of breath).  She was felt to be at high risk for coronary artery disease.  She underwent coronary angiography which showed 90% proximal LAD lesion and other nonobstructive disease (see surgical history for details).  She underwent 3.0 x 18 mm Xience Carmen drug-eluting stent placement to the diagonal.  She was discharged on Effient and aspirin.    She is accompanied today by her .  She continues to experience some mild dyspnea, but it has improved and her fatigue level has remained the same.  She denies chest pain, PND, orthopnea, edema, palpitations, dizziness, syncope, or bleeding.  Her blood pressure and heart rate are both normal today.      Past Medical History:   Diagnosis Date   •  Acquired solitary kidney    • Acute renal failure (ARF) (CMS/HCC)     requiring temporary dialysis   • AV fistula (CMS/HCC)     LUE, never has been used   • CAD (coronary artery disease) 2021   • CKD (chronic kidney disease) stage 3, GFR 30-59 ml/min (CMS/[facility], located [facility])    • Diabetic neuropathy (CMS/HCC)    • Hypertension    • Mixed hyperlipidemia    • Morbid obesity (CMS/HCC)    • Type 2 diabetes mellitus (CMS/HCC)    • Vitamin D deficiency        Past Surgical History:   Procedure Laterality Date   • CARDIAC CATHETERIZATION N/A 2021    Left main small no disease, LAD 90% proximal lesion, circumflex luminal irregularities proximally, 40-50% ostial OM1 lesion, and RCA 20% proximal, 30% mid, and 50% distal.   • CARDIAC CATHETERIZATION N/A 2021    Procedure: Left heart cath NO LV GRAM;  Surgeon: Albaro Woody MD;  Location: [facility] CATH INVASIVE LOCATION;  Service: Cardiovascular;  Laterality: N/A;   • CARDIAC CATHETERIZATION N/A 2021    Procedure: Stent NEDA coronary;  Surgeon: Albaro Woody MD;  Location: [facility] CATH INVASIVE LOCATION;  Service: Cardiovascular;  Laterality: N/A;   • CARDIAC CATHETERIZATION N/A 2021    Procedure: Optical Coherent Tomography;  Surgeon: Albaro Woody MD;  Location: [facility] CATH INVASIVE LOCATION;  Service: Cardiovascular;  Laterality: N/A;   • CORONARY STENT PLACEMENT  2021    3.0 x 18 mm Xience Carmen drug-eluting stent to the proximal LAD   • HYSTERECTOMY     • NEPHRECTOMY PARTIAL Left    • TONSILLECTOMY AND ADENOIDECTOMY         Social History     Socioeconomic History   • Marital status:      Spouse name: Not on file   • Number of children: Not on file   • Years of education: Not on file   • Highest education level: Not on file   Tobacco Use   • Smoking status: Former Smoker     Quit date: 2001     Years since quittin.0   • Smokeless tobacco: Never Used   Vaping Use   • Vaping Use: Never used   Substance and Sexual Activity   • Alcohol use:  Never   • Drug use: Never       Family History   Problem Relation Age of Onset   • Colon cancer Mother    • Heart valve disorder Mother         had a valve replacement   • Coronary artery disease Father         first MI age 45   • Coronary artery disease Brother         CABG in his early 60s   • Coronary artery disease Brother         PCIs in 60s       The following portion of the patient's history were reviewed and updated as appropriate: past medical history, past surgical history, past social history, past family history, allergies, current medications, and problem list.    Review of Systems   Constitutional: Positive for malaise/fatigue.   Cardiovascular: Positive for dyspnea on exertion.   Respiratory: Positive for shortness of breath.    Hematologic/Lymphatic: Negative.    Neurological: Negative.        Allergies   Allergen Reactions   • Barium-Containing Compounds Other (See Comments)     Low kidney function   • Contrast Dye Other (See Comments)     Pt has kidney issues.    • Levofloxacin Hives   • Vancomycin Rash         Current Outpatient Medications:   •  aspirin 81 MG EC tablet, Take 1 tablet by mouth Daily., Disp: 30 tablet, Rfl: 11  •  DULoxetine (CYMBALTA) 60 MG capsule, Daily., Disp: , Rfl:   •  famotidine (PEPCID) 20 MG tablet, Take 20 mg by mouth., Disp: , Rfl:   •  Insulin Lispro Prot & Lispro (HumaLOG Mix 75/25 KwikPen) (75-25) 100 UNIT/ML suspension pen-injector pen, INJECT 50 UNITS UNDER THE SKIN TWICE A DAY BEFORE MEALS, Disp: , Rfl:   •  losartan (COZAAR) 50 MG tablet, Take 2 tablets by mouth Daily., Disp: 60 tablet, Rfl: 0  •  metoprolol tartrate (LOPRESSOR) 25 MG tablet, TAKE 1 2 (ONE HALF) TABLET BY MOUTH TWICE DAILY, Disp: , Rfl:   •  nitroglycerin (NITROSTAT) 0.4 MG SL tablet, 1 under the tongue as needed for angina, may repeat q5mins for up three doses, Disp: 25 tablet, Rfl: 1  •  prasugrel (EFFIENT) 10 MG tablet, Take 1 tablet by mouth Daily., Disp: 30 tablet, Rfl: 11  •  vitamin D  "(ERGOCALCIFEROL) 1.25 MG (76132 UT) capsule capsule, Take 50,000 Units by mouth 1 (One) Time Per Week., Disp: , Rfl:         Objective:     Vitals:    09/08/21 1136   BP: 136/86   Pulse: 84   Weight: 122 kg (270 lb)   Height: 160 cm (63\")     Body mass index is 47.83 kg/m².    PHYSICAL EXAM:    Vitals Reviewed.   General Appearance: No acute distress, well developed and well nourished. Obese.   Eyes: Conjunctiva and lids: No erythema, swelling, or discharge. Sclera non-icteric.  Wears glasses.  HENT: Atraumatic, normocephalic. External eyes, ears, and nose normal. No hearing loss noted. Mucous membranes normal. Lips not cyanotic. Neck supple with no tenderness.  Wearing a mask.  Respiratory: No signs of respiratory distress. Respiration rhythm and depth normal.   Clear to auscultation. No rales, crackles, rhonchi, or wheezing auscultated.   Cardiovascular:  Jugular Venous Pressure: Normal  Heart Rate and Rhythm: Normal, Heart Sounds: Normal S1 and S2. No S3 or S4 noted.  Murmurs: No murmurs noted. No rubs, thrills, or gallops.   Lower Extremities: No edema noted.  Gastrointestinal:  Abdomen soft, non-distended, non-tender. Normal bowel sounds.    Musculoskeletal: Normal movement of extremities  Skin and Nails: General appearance normal. No pallor, cyanosis, diaphoresis. Skin temperature normal. No clubbing of fingernails.   Psychiatric: Patient alert and oriented to person, place, and time. Speech and behavior appropriate. Normal mood and affect.       ECG 12 Lead    Date/Time: 9/8/2021 11:41 AM  Performed by: Elena Pal APRN  Authorized by: Elena Pal APRN   Comparison: compared with previous ECG from 8/30/2021  Similar to previous ECG  Rhythm: sinus rhythm  Rate: normal  BPM: 84  Conduction: conduction normal  ST Elevation: II, III and aVF  QRS axis: normal  Other findings: non-specific ST-T wave changes    Clinical impression: non-specific ECG              Assessment:       Diagnosis Plan   1. " Coronary artery disease involving native coronary artery of native heart without angina pectoris  Basic Metabolic Panel    ECG 12 Lead   2. Dyspnea on exertion  Basic Metabolic Panel   3. Other fatigue  Basic Metabolic Panel   4. Essential hypertension     5. Stage 3b chronic kidney disease (CMS/HCC)     6. Morbid obesity (CMS/HCC)            Plan:       1.  Coronary Artery Disease: He recently underwent cardiac catheterization in August 2021 and underwent drug-eluting stent placement to the proximal LAD.  Her fatigue is remained the same and dyspnea improved.  She denies any further chest pain or scapular pain.  We discussed the importance of staying on dual antiplatelet therapy for at least 1 year (aspirin and prasugrel).  She denies bleeding.  She would benefit from participating in cardiac rehab.    2/3.  Dyspnea on Exertion: Most likely not cardiac related if her dyspnea has not resolved with the stent placement.  Hopefully with cardiac rehab her dyspnea will improve.    4.  Hypertension: Blood pressure could be a little bit tighter controlled I recommended increasing her losartan 100 mg daily.    5.  Chronic Kidney Disease:.  We will repeat a BMP in 1 to 2 weeks with the increase of the losartan.    6.  Obesity: BMI is 47.8 and she would benefit from exercise, weight loss, and heart healthy/low-sodium diet.    7.  I recommended a blood pressure check and BMP in 1 to 2 weeks.  She will follow up with Johan Bunch in 2 months.      As always, it has been a pleasure to participate in your patient's care. Thank you.       Sincerely,       AUTUMN Ruth  [facility] Cardiology      · COVID-19 Precautions - Patient was compliant in wearing a mask. When I saw the patient, I used appropriate personal protective equipment (PPE) including mask and eye shield (standard procedure).  Additionally, I used gown and gloves if indicated.  Hand hygiene was completed before and after seeing the  patient.  · Dictated utilizing Dragon Dictation

## 2021-09-09 ENCOUNTER — READMISSION MANAGEMENT (OUTPATIENT)
Dept: CALL CENTER | Facility: HOSPITAL | Age: 66
End: 2021-09-09

## 2021-09-09 NOTE — OUTREACH NOTE
Medical Week 1 Survey      Responses   [facility] patient discharged from?  [place]   Does the patient have one of the following disease processes/diagnoses(primary or secondary)?  Other   Week 1 attempt successful?  Yes   Call start time  1236   Call end time  1237   Discharge diagnosis  Unstable angina (CMS/HCC)   Is patient permission given to speak with other caregiver?  Yes   List who call center can speak with  spouse- Josue   Person spoke with today (if not patient) and relationship  spouse- Josue   Does the patient have all medications ordered at discharge?  Yes   Is the patient taking all medications as directed (includes completed medication regime)?  Yes   Does the patient have a primary care provider?   Yes   Comments regarding PCP  patient will be making her f/u appts   Has the patient kept scheduled appointments due by today?  N/A   Has home health visited the patient within 72 hours of discharge?  N/A   Psychosocial issues?  No   Did the patient receive a copy of their discharge instructions?  Yes   Nursing interventions  Reviewed instructions with patient   What is the patient's perception of their health status since discharge?  Improving   Is the patient/caregiver able to teach back the hierarchy of who to call/visit for symptoms/problems? PCP, Specialist, Home health nurse, Urgent Care, ED, 911  Yes   Week 1 call completed?  Yes   Wrap up additional comments  Per spouse, patient is doing well, no questions or concerns at this time.          Geno Giraldo RN

## 2021-09-16 ENCOUNTER — READMISSION MANAGEMENT (OUTPATIENT)
Dept: CALL CENTER | Facility: HOSPITAL | Age: 66
End: 2021-09-16

## 2021-09-16 NOTE — OUTREACH NOTE
Medical Week 2 Survey      Responses   [facility] patient discharged from?  [place]   Does the patient have one of the following disease processes/diagnoses(primary or secondary)?  Other   Week 2 attempt successful?  Yes   Call start time  1338   Discharge diagnosis  Unstable angina (CMS/[facility], located within [facility])   Call end time  1343   Is patient permission given to speak with other caregiver?  Yes   List who call center can speak with  spouse- Josue   Person spoke with today (if not patient) and relationship  spouse- Josue   Meds reviewed with patient/caregiver?  Yes   Is the patient having any side effects they believe may be caused by any medication additions or changes?  Yes   Side effects comments   bruising easily d/t blood thinner   Does the patient have all medications ordered at discharge?  Yes   Is the patient taking all medications as directed (includes completed medication regime)?  Yes   Medication comments  increased losartan to BID   Does the patient have a primary care provider?   Yes   Does the patient have an appointment with their PCP within 7 days of discharge?  Yes   Comments regarding PCP  Dr. Coppola    Has the patient kept scheduled appointments due by today?  Yes   Has home health visited the patient within 72 hours of discharge?  N/A   Psychosocial issues?  No   Did the patient receive a copy of their discharge instructions?  Yes   Nursing interventions  Reviewed instructions with patient   What is the patient's perception of their health status since discharge?  Improving   Is the patient/caregiver able to teach back signs and symptoms related to disease process for when to call PCP?  Yes   Is the patient/caregiver able to teach back signs and symptoms related to disease process for when to call 911?  Yes   Is the patient/caregiver able to teach back the hierarchy of who to call/visit for symptoms/problems? PCP, Specialist, Home health nurse, Urgent Care, ED, 911  Yes   Week 2 Call Completed?  Yes    Wrap up additional comments  States haven't heard from cardiac rehab yet. Explained should be calling soon, can see where referral was sent. Denies any other needs.           Lubna Posadas RN

## 2021-09-27 ENCOUNTER — LAB (OUTPATIENT)
Dept: LAB | Facility: HOSPITAL | Age: 66
End: 2021-09-27

## 2021-09-27 ENCOUNTER — CLINICAL SUPPORT (OUTPATIENT)
Dept: CARDIOLOGY | Facility: CLINIC | Age: 66
End: 2021-09-27

## 2021-09-27 VITALS — SYSTOLIC BLOOD PRESSURE: 148 MMHG | DIASTOLIC BLOOD PRESSURE: 76 MMHG | HEART RATE: 100 BPM

## 2021-09-27 DIAGNOSIS — R53.83 OTHER FATIGUE: ICD-10-CM

## 2021-09-27 DIAGNOSIS — R06.09 DYSPNEA ON EXERTION: ICD-10-CM

## 2021-09-27 DIAGNOSIS — I25.10 CORONARY ARTERY DISEASE INVOLVING NATIVE CORONARY ARTERY OF NATIVE HEART WITHOUT ANGINA PECTORIS: ICD-10-CM

## 2021-09-27 LAB
ANION GAP SERPL CALCULATED.3IONS-SCNC: 13.5 MMOL/L (ref 5–15)
BUN SERPL-MCNC: 19 MG/DL (ref 8–23)
BUN/CREAT SERPL: 14 (ref 7–25)
CALCIUM SPEC-SCNC: 10.5 MG/DL (ref 8.6–10.5)
CHLORIDE SERPL-SCNC: 104 MMOL/L (ref 98–107)
CO2 SERPL-SCNC: 23.5 MMOL/L (ref 22–29)
CREAT SERPL-MCNC: 1.36 MG/DL (ref 0.57–1)
GFR SERPL CREATININE-BSD FRML MDRD: 39 ML/MIN/1.73
GLUCOSE SERPL-MCNC: 108 MG/DL (ref 65–99)
POTASSIUM SERPL-SCNC: 4.4 MMOL/L (ref 3.5–5.2)
SODIUM SERPL-SCNC: 141 MMOL/L (ref 136–145)

## 2021-09-27 PROCEDURE — 36415 COLL VENOUS BLD VENIPUNCTURE: CPT

## 2021-09-27 PROCEDURE — 80048 BASIC METABOLIC PNL TOTAL CA: CPT

## 2021-09-27 NOTE — PROGRESS NOTES
Pt presents today for bp check.  Verified medication and any medication changes.      BP Readings from Last 3 Encounters:   09/08/21 136/86   08/31/21 169/71   08/30/21 130/84         Today pt's bp was :   148/76 hr 100.   Bp can not be taken in L arm due to fistula implant. .  Pt denied any complaints.  Pt's bp is within the office parameters.  Pt released from office to the lab to have lab work completed.        Note: Pt was due to follow up in 2 months with Dr. Bunch, there are not openings within that timeframe.  Pt does have an appt scheduled for 12/23 with Dr. Bunch.  Do we need to work pt in?

## 2021-09-28 ENCOUNTER — TELEPHONE (OUTPATIENT)
Dept: CARDIOLOGY | Facility: CLINIC | Age: 66
End: 2021-09-28

## 2021-09-28 NOTE — TELEPHONE ENCOUNTER
----- Message from Johan Bunch MD sent at 9/28/2021  9:22 AM EDT -----  Labs are stable, defer BP management to her nephrologist.    Okay to schedule at 3m rather than 2.    Jdk

## 2022-01-19 ENCOUNTER — OFFICE VISIT (OUTPATIENT)
Dept: CARDIOLOGY | Facility: CLINIC | Age: 67
End: 2022-01-19

## 2022-01-19 VITALS
HEIGHT: 63 IN | HEART RATE: 89 BPM | DIASTOLIC BLOOD PRESSURE: 78 MMHG | SYSTOLIC BLOOD PRESSURE: 144 MMHG | WEIGHT: 262.8 LBS | BODY MASS INDEX: 46.56 KG/M2

## 2022-01-19 DIAGNOSIS — E78.2 MIXED HYPERLIPIDEMIA: ICD-10-CM

## 2022-01-19 DIAGNOSIS — Z79.4 TYPE 2 DIABETES MELLITUS WITH DIABETIC POLYNEUROPATHY, WITH LONG-TERM CURRENT USE OF INSULIN: ICD-10-CM

## 2022-01-19 DIAGNOSIS — I25.10 CORONARY ARTERY DISEASE INVOLVING NATIVE CORONARY ARTERY OF NATIVE HEART WITHOUT ANGINA PECTORIS: Primary | ICD-10-CM

## 2022-01-19 DIAGNOSIS — E11.42 TYPE 2 DIABETES MELLITUS WITH DIABETIC POLYNEUROPATHY, WITH LONG-TERM CURRENT USE OF INSULIN: ICD-10-CM

## 2022-01-19 DIAGNOSIS — N18.32 STAGE 3B CHRONIC KIDNEY DISEASE: ICD-10-CM

## 2022-01-19 DIAGNOSIS — E66.01 MORBID OBESITY: ICD-10-CM

## 2022-01-19 DIAGNOSIS — I10 PRIMARY HYPERTENSION: ICD-10-CM

## 2022-01-19 PROBLEM — R06.09 DYSPNEA ON EXERTION: Status: RESOLVED | Noted: 2021-08-30 | Resolved: 2022-01-19

## 2022-01-19 PROCEDURE — 93000 ELECTROCARDIOGRAM COMPLETE: CPT | Performed by: INTERNAL MEDICINE

## 2022-01-19 PROCEDURE — 99214 OFFICE O/P EST MOD 30 MIN: CPT | Performed by: INTERNAL MEDICINE

## 2022-01-19 NOTE — PROGRESS NOTES
Date of Office Visit: 22  Encounter Provider: Johan Bunch MD  Place of Service: [facility] CARDIOLOGY  Patient Name: Idalia Okeefe  :1955    Chief Complaint   Patient presents with   • Coronary Artery Disease   :     HPI:     Ms. Okeefe is 66 y.o. and presents today to follow up.  I have reviewed prior notes and there are no changes except for any new updates described below. I have also reviewed any information entered into the medical record by the patient or by ancillary staff.     She has a long history of insulin-dependent type 2 diabetes. She has a history of acute renal failure which briefly required hemodialysis in the past.  She has a left upper extremity AV fistula that she has never had to use.  She has stage III chronic renal insufficiency, with a serum creatinine of 1.3-1.4. She has hypertension.  She has hyperlipidemia and states that she is intolerant of statins.  She is a former smoker, having quit in .  Her mother had valvular heart disease and required open heart surgery for a valve replacement.  Her father had his first MI at age 45.  2 of her 3 brothers have been diagnosed with heart disease in their early 60s.    She presented in 2021 with dyspnea and chest discomfort. She underwent coronary angiography and was found to have severe disease of the proximal LAD, which was treated with a drug eluting stent. There was mild-moderate disease elsewhere. She was never contacted by cardiac rehab and has not been exercising.    She reports constant fatigue, and ongoing exertional dyspnea. She has rare, sharp, short-lived chest pains. She does not have orthopnea or leg swelling. She reports high levels of debt with medical testing, including lab draws, and has a stack of bills at home.     Past Medical History:   Diagnosis Date   • Acquired solitary kidney    • Acute renal failure (ARF) (HCC)     requiring temporary dialysis   • AV fistula (HCC)      LUE, never has been used   • CAD (coronary artery disease) 2021    90% prox LAD s/p 3x18mm Xience, LI of LCx, 40-50% OM, 30% mid/50% distal RCA   • CKD (chronic kidney disease) stage 3, GFR 30-59 ml/min ([facility])    • Diabetic neuropathy ([facility])    • Hypertension    • Mixed hyperlipidemia    • Morbid obesity ([facility])    • Type 2 diabetes mellitus ([facility])    • Vitamin D deficiency        Past Surgical History:   Procedure Laterality Date   • CARDIAC CATHETERIZATION N/A 2021    Left main small no disease, LAD 90% proximal lesion, circumflex luminal irregularities proximally, 40-50% ostial OM1 lesion, and RCA 20% proximal, 30% mid, and 50% distal.   • CARDIAC CATHETERIZATION N/A 2021    Procedure: Left heart cath NO LV GRAM;  Surgeon: Albaro Woody MD;  Location: [facility] CATH INVASIVE LOCATION;  Service: Cardiovascular;  Laterality: N/A;   • CARDIAC CATHETERIZATION N/A 2021    Procedure: Stent NEDA coronary;  Surgeon: Albaro Woody MD;  Location: [facility] CATH INVASIVE LOCATION;  Service: Cardiovascular;  Laterality: N/A;   • CARDIAC CATHETERIZATION N/A 2021    Procedure: Optical Coherent Tomography;  Surgeon: Albaro Woody MD;  Location: [facility] CATH INVASIVE LOCATION;  Service: Cardiovascular;  Laterality: N/A;   • CORONARY STENT PLACEMENT  2021    3.0 x 18 mm Xience Carmen drug-eluting stent to the proximal LAD   • HYSTERECTOMY     • NEPHRECTOMY PARTIAL Left    • TONSILLECTOMY AND ADENOIDECTOMY         Social History     Socioeconomic History   • Marital status:    Tobacco Use   • Smoking status: Former Smoker     Quit date: 2001     Years since quittin.4   • Smokeless tobacco: Never Used   Vaping Use   • Vaping Use: Never used   Substance and Sexual Activity   • Alcohol use: Never   • Drug use: Never       Family History   Problem Relation Age of Onset   • Colon cancer Mother    • Heart valve disorder Mother         had a valve replacement   • Coronary artery disease Father          first MI age 45   • Coronary artery disease Brother         CABG in his early 60s   • Coronary artery disease Brother         PCIs in 60s       Review of Systems   Constitutional: Positive for malaise/fatigue.   Cardiovascular: Positive for chest pain.   Hematologic/Lymphatic: Bruises/bleeds easily.   Musculoskeletal: Positive for back pain, joint pain and myalgias.   Neurological: Positive for excessive daytime sleepiness, numbness and paresthesias.   Psychiatric/Behavioral: Positive for depression.   All other systems reviewed and are negative.      Allergies   Allergen Reactions   • Barium-Containing Compounds Other (See Comments)     Low kidney function   • Contrast Dye Other (See Comments)     Pt has kidney issues.    • Iodinated Diagnostic Agents Other (See Comments)     Other reaction(s): Other (See Comments)  Pt has kidney issues.    • Levofloxacin Hives   • Vancomycin Rash         Current Outpatient Medications:   •  aspirin 81 MG EC tablet, Take 1 tablet by mouth Daily., Disp: 30 tablet, Rfl: 11  •  DULoxetine (CYMBALTA) 60 MG capsule, Daily., Disp: , Rfl:   •  famotidine (PEPCID) 20 MG tablet, Take 20 mg by mouth., Disp: , Rfl:   •  Insulin Lispro Prot & Lispro (HumaLOG Mix 75/25 KwikPen) (75-25) 100 UNIT/ML suspension pen-injector pen, INJECT 50 UNITS UNDER THE SKIN TWICE A DAY BEFORE MEALS, Disp: , Rfl:   •  losartan (COZAAR) 50 MG tablet, Take 2 tablets by mouth Daily., Disp: 60 tablet, Rfl: 0  •  metoprolol tartrate (LOPRESSOR) 25 MG tablet, 25 mg 2 (Two) Times a Day., Disp: , Rfl:   •  nitroglycerin (NITROSTAT) 0.4 MG SL tablet, 1 under the tongue as needed for angina, may repeat q5mins for up three doses, Disp: 25 tablet, Rfl: 1  •  prasugrel (EFFIENT) 10 MG tablet, Take 1 tablet by mouth Daily., Disp: 30 tablet, Rfl: 11  •  vitamin D (ERGOCALCIFEROL) 1.25 MG (76732 UT) capsule capsule, Take 50,000 Units by mouth 1 (One) Time Per Week., Disp: , Rfl:       Objective:     Vitals:    01/19/22  "1303   BP: 144/78   BP Location: Right arm   Pulse: 89   Weight: 119 kg (262 lb 12.8 oz)   Height: 160 cm (63\")     Body mass index is 46.55 kg/m².    Vitals reviewed.   Constitutional:       Appearance: Not in distress.      Comments: Obese   Eyes:      Conjunctiva/sclera: Conjunctivae normal.   HENT:      Head: Normocephalic.      Nose: Nose normal.         Comments: Masked  Neck:      Comments: Cannot assess for JVD due to habitus  Pulmonary:      Effort: Pulmonary effort is normal.      Breath sounds: Normal breath sounds.   Cardiovascular:      Normal rate. Regular rhythm.      Murmurs: There is no murmur.   Pulses:     Intact distal pulses.   Edema:     Peripheral edema absent.   Abdominal:      Palpations: Abdomen is soft.      Tenderness: There is no abdominal tenderness.      Comments: Obesity limits abdominal exam   Musculoskeletal: Normal range of motion.      Cervical back: Normal range of motion. Skin:     General: Skin is warm and dry.      Findings: No rash.   Neurological:      General: No focal deficit present.      Mental Status: Alert and oriented to person, place, and time.      Cranial Nerves: No cranial nerve deficit.   Psychiatric:         Behavior: Behavior normal.         Thought Content: Thought content normal.         Judgment: Judgment normal.           ECG 12 Lead    Date/Time: 1/19/2022 1:19 PM  Performed by: Johan Bunch MD  Authorized by: Johan Bunch MD   Comparison: compared with previous ECG   Similar to previous ECG  Rhythm: sinus rhythm  Conduction: conduction normal  ST Segments: ST segments normal  T Waves: T waves normal  QRS axis: normal  Other: no other findings    Clinical impression: normal ECG            Assessment:       Diagnosis Plan   1. Coronary artery disease involving native coronary artery of native heart without angina pectoris  ECG 12 Lead   2. Mixed hyperlipidemia     3. Primary hypertension     4. Stage 3b chronic kidney disease (HCC)     5. Morbid obesity " ([facility])     6. Type 2 diabetes mellitus with diabetic polyneuropathy, with long-term current use of insulin ([facility])        Plan:     Coronary Artery Disease  Subjective - Objective  • There has been a previous stent procedure using NEDA 8/2021  • Current antiplatelet therapy includes aspirin 81 mg and prasugrel 10 mg  • The patient qualifies for cardiac rehabilitation, and has been referred to cardiac rehab      It does sound as though her chest pressure has resolved, but she remains significantly fatigued and short winded.  Having a difficult time seeing if this ever improved after the procedure.  I am not sure if this is cardiac or not.  She is on dual antiplatelet therapy, so anemia could be a possibility.  Profound deconditioning could be a possibility.  Unfortunately, tests such as labs and stress test are cost prohibitive right now.  I am going to send a message to cardiac rehab at [facility] to see if they can get her in.  She wonders if it could be any of her medications.  We did increase her metoprolol, so we will go back to half tablet twice daily.  Also, her losartan was doubled and I guess there could be some renal dysfunction.  However, without the ability to get any labs, I cannot really offer much advice.    My recommendation would be that she be on a PCSK9 inhibitor, but I will defer this to her endocrinologist.        Sincerely,       Johan Bunch MD

## 2022-08-29 RX ORDER — PRASUGREL 10 MG/1
TABLET, FILM COATED ORAL
Qty: 30 TABLET | Refills: 0 | Status: SHIPPED | OUTPATIENT
Start: 2022-08-29 | End: 2022-09-27

## 2022-09-27 RX ORDER — PRASUGREL 10 MG/1
TABLET, FILM COATED ORAL
Qty: 30 TABLET | Refills: 0 | Status: SHIPPED | OUTPATIENT
Start: 2022-09-27 | End: 2022-10-31

## 2022-10-03 ENCOUNTER — TRANSCRIBE ORDERS (OUTPATIENT)
Dept: LAB | Facility: HOSPITAL | Age: 67
End: 2022-10-03

## 2022-10-03 ENCOUNTER — LAB (OUTPATIENT)
Dept: LAB | Facility: HOSPITAL | Age: 67
End: 2022-10-03

## 2022-10-03 DIAGNOSIS — N18.30 STAGE 3 CHRONIC KIDNEY DISEASE, UNSPECIFIED WHETHER STAGE 3A OR 3B CKD: ICD-10-CM

## 2022-10-03 DIAGNOSIS — N18.30 STAGE 3 CHRONIC KIDNEY DISEASE, UNSPECIFIED WHETHER STAGE 3A OR 3B CKD: Primary | ICD-10-CM

## 2022-10-03 LAB
25(OH)D3 SERPL-MCNC: 62.7 NG/ML (ref 30–100)
ALBUMIN SERPL-MCNC: 3.9 G/DL (ref 3.5–5.2)
ANION GAP SERPL CALCULATED.3IONS-SCNC: 11.8 MMOL/L (ref 5–15)
BACTERIA UR QL AUTO: ABNORMAL /HPF
BASOPHILS # BLD AUTO: 0.05 10*3/MM3 (ref 0–0.2)
BASOPHILS NFR BLD AUTO: 0.5 % (ref 0–1.5)
BILIRUB UR QL STRIP: NEGATIVE
BUN SERPL-MCNC: 17 MG/DL (ref 8–23)
BUN/CREAT SERPL: 13.1 (ref 7–25)
CALCIUM SPEC-SCNC: 9.8 MG/DL (ref 8.6–10.5)
CHLORIDE SERPL-SCNC: 106 MMOL/L (ref 98–107)
CLARITY UR: ABNORMAL
CO2 SERPL-SCNC: 21.2 MMOL/L (ref 22–29)
COLOR UR: YELLOW
CREAT SERPL-MCNC: 1.3 MG/DL (ref 0.57–1)
CREAT UR-MCNC: 175.1 MG/DL
DEPRECATED RDW RBC AUTO: 42.9 FL (ref 37–54)
EGFRCR SERPLBLD CKD-EPI 2021: 44.9 ML/MIN/1.73
EOSINOPHIL # BLD AUTO: 0.33 10*3/MM3 (ref 0–0.4)
EOSINOPHIL NFR BLD AUTO: 3.5 % (ref 0.3–6.2)
ERYTHROCYTE [DISTWIDTH] IN BLOOD BY AUTOMATED COUNT: 13.7 % (ref 12.3–15.4)
GLUCOSE SERPL-MCNC: 103 MG/DL (ref 65–99)
GLUCOSE UR STRIP-MCNC: NEGATIVE MG/DL
HCT VFR BLD AUTO: 39.4 % (ref 34–46.6)
HGB BLD-MCNC: 12.8 G/DL (ref 12–15.9)
HGB UR QL STRIP.AUTO: NEGATIVE
HYALINE CASTS UR QL AUTO: ABNORMAL /LPF
IMM GRANULOCYTES # BLD AUTO: 0.04 10*3/MM3 (ref 0–0.05)
IMM GRANULOCYTES NFR BLD AUTO: 0.4 % (ref 0–0.5)
KETONES UR QL STRIP: NEGATIVE
LEUKOCYTE ESTERASE UR QL STRIP.AUTO: ABNORMAL
LYMPHOCYTES # BLD AUTO: 3.83 10*3/MM3 (ref 0.7–3.1)
LYMPHOCYTES NFR BLD AUTO: 41 % (ref 19.6–45.3)
MCH RBC QN AUTO: 27.8 PG (ref 26.6–33)
MCHC RBC AUTO-ENTMCNC: 32.5 G/DL (ref 31.5–35.7)
MCV RBC AUTO: 85.7 FL (ref 79–97)
MONOCYTES # BLD AUTO: 0.75 10*3/MM3 (ref 0.1–0.9)
MONOCYTES NFR BLD AUTO: 8 % (ref 5–12)
NEUTROPHILS NFR BLD AUTO: 4.35 10*3/MM3 (ref 1.7–7)
NEUTROPHILS NFR BLD AUTO: 46.6 % (ref 42.7–76)
NITRITE UR QL STRIP: POSITIVE
NRBC BLD AUTO-RTO: 0 /100 WBC (ref 0–0.2)
PH UR STRIP.AUTO: 5.5 [PH] (ref 5–8)
PHOSPHATE SERPL-MCNC: 2.9 MG/DL (ref 2.5–4.5)
PLATELET # BLD AUTO: 265 10*3/MM3 (ref 140–450)
PMV BLD AUTO: 9.4 FL (ref 6–12)
POTASSIUM SERPL-SCNC: 4 MMOL/L (ref 3.5–5.2)
PROT ?TM UR-MCNC: 23 MG/DL
PROT UR QL STRIP: ABNORMAL
PROT/CREAT UR: 0.13 MG/G{CREAT}
PTH-INTACT SERPL-MCNC: 41.5 PG/ML (ref 15–65)
RBC # BLD AUTO: 4.6 10*6/MM3 (ref 3.77–5.28)
RBC # UR STRIP: ABNORMAL /HPF
REF LAB TEST METHOD: ABNORMAL
SODIUM SERPL-SCNC: 139 MMOL/L (ref 136–145)
SP GR UR STRIP: 1.02 (ref 1–1.03)
SQUAMOUS #/AREA URNS HPF: ABNORMAL /HPF
UROBILINOGEN UR QL STRIP: ABNORMAL
WBC # UR STRIP: ABNORMAL /HPF
WBC NRBC COR # BLD: 9.35 10*3/MM3 (ref 3.4–10.8)

## 2022-10-03 PROCEDURE — 82306 VITAMIN D 25 HYDROXY: CPT

## 2022-10-03 PROCEDURE — 83970 ASSAY OF PARATHORMONE: CPT

## 2022-10-03 PROCEDURE — 81001 URINALYSIS AUTO W/SCOPE: CPT

## 2022-10-03 PROCEDURE — 85025 COMPLETE CBC W/AUTO DIFF WBC: CPT

## 2022-10-03 PROCEDURE — 80069 RENAL FUNCTION PANEL: CPT

## 2022-10-03 PROCEDURE — 82570 ASSAY OF URINE CREATININE: CPT

## 2022-10-03 PROCEDURE — 36415 COLL VENOUS BLD VENIPUNCTURE: CPT

## 2022-10-03 PROCEDURE — 84156 ASSAY OF PROTEIN URINE: CPT

## 2022-10-31 RX ORDER — PRASUGREL 10 MG/1
TABLET, FILM COATED ORAL
Qty: 30 TABLET | Refills: 0 | Status: SHIPPED | OUTPATIENT
Start: 2022-10-31 | End: 2022-12-01

## 2022-11-15 NOTE — PROGRESS NOTES
RM:________     PCP: Christi Coppola MD    : 1955  AGE: 67 y.o.  EST PATIENT   REASON FOR VISIT/  CC:    BP Readings from Last 3 Encounters:   22 144/78   21 148/76   21 136/86        WT: ____________ BP: __________L __________R HR______    CHEST PAIN: _____________    SOA: _____________PALPS: _______________     LIGHTHEADED: ___________FATIGUE: ________________ EDEMA __________    ALLERGIES:Barium-containing compounds, Contrast dye, Iodinated diagnostic agents, Levofloxacin, and Vancomycin SMOKING HISTORY:  Social History     Tobacco Use   • Smoking status: Former     Types: Cigarettes     Quit date: 2001     Years since quittin.2   • Smokeless tobacco: Never   Vaping Use   • Vaping Use: Never used   Substance Use Topics   • Alcohol use: Never   • Drug use: Never     CAFFEINE USE_________________  ALCOHOL ______________________    Below is the patient's most recent value for Albumin, ALT, AST, BUN, Calcium, Chloride, Cholesterol, CO2, Creatinine, GFR, Glucose, HDL, Hematocrit, Hemoglobin, Hemoglobin A1C, LDL, Magnesium, Phosphorus, Platelets, Potassium, PSA, Sodium, Triglycerides, TSH and WBC.   Lab Results   Component Value Date    ALBUMIN 3.90 10/03/2022    ALT 16 2021    AST 21 2021    BUN 17 10/03/2022    CALCIUM 9.8 10/03/2022     10/03/2022    CO2 21.2 (L) 10/03/2022    CREATININE 1.30 (H) 10/03/2022     (H) 2020    HDL 41 (L) 2020    HCT 39.4 10/03/2022    HGB 12.8 10/03/2022    HGBA1C 6.9 (H) 2020     (H) 2020    PHOS 2.9 10/03/2022     10/03/2022    K 4.0 10/03/2022     10/03/2022    TRIG 137 2020    TSH 1.700 2020    WBC 9.35 10/03/2022          NEW DIAGNOSIS/ SURGERY/ HOSP OR ED VISITS: ______________________    __________________________________________________________________      RECENT LABS OR DIAGNOSTIC TESTING:   _____________________________    __________________________________________________________________      ASSESSMENT/ PLAN: _______________________________________________    __________________________________________________________________

## 2022-11-30 ENCOUNTER — OFFICE VISIT (OUTPATIENT)
Dept: CARDIOLOGY | Facility: CLINIC | Age: 67
End: 2022-11-30

## 2022-11-30 VITALS
SYSTOLIC BLOOD PRESSURE: 138 MMHG | HEART RATE: 105 BPM | HEIGHT: 63 IN | DIASTOLIC BLOOD PRESSURE: 76 MMHG | BODY MASS INDEX: 46.14 KG/M2 | WEIGHT: 260.4 LBS

## 2022-11-30 DIAGNOSIS — E11.42 TYPE 2 DIABETES MELLITUS WITH DIABETIC POLYNEUROPATHY, WITH LONG-TERM CURRENT USE OF INSULIN: ICD-10-CM

## 2022-11-30 DIAGNOSIS — E78.2 MIXED HYPERLIPIDEMIA: ICD-10-CM

## 2022-11-30 DIAGNOSIS — Z79.4 TYPE 2 DIABETES MELLITUS WITH DIABETIC POLYNEUROPATHY, WITH LONG-TERM CURRENT USE OF INSULIN: ICD-10-CM

## 2022-11-30 DIAGNOSIS — I10 PRIMARY HYPERTENSION: ICD-10-CM

## 2022-11-30 DIAGNOSIS — I25.10 CORONARY ARTERY DISEASE INVOLVING NATIVE CORONARY ARTERY OF NATIVE HEART WITHOUT ANGINA PECTORIS: Primary | ICD-10-CM

## 2022-11-30 DIAGNOSIS — N18.32 STAGE 3B CHRONIC KIDNEY DISEASE: ICD-10-CM

## 2022-11-30 PROCEDURE — 99214 OFFICE O/P EST MOD 30 MIN: CPT | Performed by: INTERNAL MEDICINE

## 2022-11-30 PROCEDURE — 93000 ELECTROCARDIOGRAM COMPLETE: CPT | Performed by: INTERNAL MEDICINE

## 2022-11-30 RX ORDER — NITROGLYCERIN 0.4 MG/1
TABLET SUBLINGUAL
Qty: 25 TABLET | Refills: 1 | Status: SHIPPED | OUTPATIENT
Start: 2022-11-30

## 2022-11-30 NOTE — PROGRESS NOTES
Date of Office Visit: 22  Encounter Provider: Johan Bunch MD  Place of Service: [facility] CARDIOLOGY  Patient Name: Idalia Okeefe  :1955    Chief Complaint   Patient presents with   • Coronary Artery Disease   :     HPI:     Ms. Okeefe is 67 y.o. and presents today to follow up.  I have reviewed prior notes and there are no changes except for any new updates described below. I have also reviewed any information entered into the medical record by the patient or by ancillary staff.     She has a long history of insulin-dependent type 2 diabetes. She has a history of acute renal failure which briefly required hemodialysis in the past.  She has a left upper extremity AV fistula that she has never had to use.  She has stage III chronic renal insufficiency, with a serum creatinine of 1.3-1.4. She has hypertension.  She has hyperlipidemia and states that she is intolerant of statins.  She is a former smoker, having quit in .  Her mother had valvular heart disease and required open heart surgery for a valve replacement.  Her father had his first MI at age 45.  2 of her 3 brothers have been diagnosed with heart disease in their early 60s.    She presented in 2021 with dyspnea and chest discomfort. She underwent coronary angiography and was found to have severe disease of the proximal LAD, which was treated with a drug eluting stent. There was mild-moderate disease elsewhere.     Despite revascularization, she still reported fatigue and dyspnea. We tried adjusting medications which did not help. She was never able to do rehab and they never contacted her. She continues to report severe exertional fatigue and exertional dyspnea.She has episodes of chest pain that can be very mild or very intense; these are positional and get better with rubbing her chest or lying on her left side. She gets dizzy at times.     Past Medical History:   Diagnosis Date   • Acquired solitary  kidney    • Acute renal failure (ARF) ([facility])     requiring temporary dialysis   • AV fistula ([facility])     LUE, never has been used   • CAD (coronary artery disease) 2021    90% prox LAD s/p 3x18mm Xience, LI of LCx, 40-50% OM, 30% mid/50% distal RCA   • CKD (chronic kidney disease) stage 3, GFR 30-59 ml/min ([facility])    • Diabetic neuropathy ([facility])    • Hypertension    • Mixed hyperlipidemia    • Morbid obesity ([facility])    • Type 2 diabetes mellitus ([facility])    • Vitamin D deficiency        Past Surgical History:   Procedure Laterality Date   • CARDIAC CATHETERIZATION N/A 2021    Left main small no disease, LAD 90% proximal lesion, circumflex luminal irregularities proximally, 40-50% ostial OM1 lesion, and RCA 20% proximal, 30% mid, and 50% distal.   • CARDIAC CATHETERIZATION N/A 2021    Procedure: Left heart cath NO LV GRAM;  Surgeon: Albaro Woody MD;  Location: [facility] CATH INVASIVE LOCATION;  Service: Cardiovascular;  Laterality: N/A;   • CARDIAC CATHETERIZATION N/A 2021    Procedure: Stent NEDA coronary;  Surgeon: Albaro Woody MD;  Location: [facility] CATH INVASIVE LOCATION;  Service: Cardiovascular;  Laterality: N/A;   • CARDIAC CATHETERIZATION N/A 2021    Procedure: Optical Coherent Tomography;  Surgeon: Albaro Woody MD;  Location: [facility] CATH INVASIVE LOCATION;  Service: Cardiovascular;  Laterality: N/A;   • CORONARY STENT PLACEMENT  2021    3.0 x 18 mm Xience Carmen drug-eluting stent to the proximal LAD   • HYSTERECTOMY     • NEPHRECTOMY PARTIAL Left    • TONSILLECTOMY AND ADENOIDECTOMY         Social History     Socioeconomic History   • Marital status:    Tobacco Use   • Smoking status: Former     Types: Cigarettes     Quit date: 2001     Years since quittin.2   • Smokeless tobacco: Never   Vaping Use   • Vaping Use: Never used   Substance and Sexual Activity   • Alcohol use: Never   • Drug use: Never       Family History   Problem Relation Age of Onset   • Colon  cancer Mother    • Heart valve disorder Mother         had a valve replacement   • Coronary artery disease Father         first MI age 45   • Coronary artery disease Brother         CABG in his early 60s   • Coronary artery disease Brother         PCIs in 60s       Review of Systems   Constitutional: Positive for malaise/fatigue.   Cardiovascular: Positive for chest pain and dyspnea on exertion.   Hematologic/Lymphatic: Bruises/bleeds easily.   Musculoskeletal: Positive for back pain, joint pain and myalgias.   Neurological: Positive for excessive daytime sleepiness, numbness and paresthesias.   Psychiatric/Behavioral: Positive for depression.   All other systems reviewed and are negative.      Allergies   Allergen Reactions   • Barium-Containing Compounds Other (See Comments)     Low kidney function   • Contrast Dye Other (See Comments)     Pt has kidney issues.    • Iodinated Diagnostic Agents Other (See Comments)     Other reaction(s): Other (See Comments)  Pt has kidney issues.    • Levofloxacin Hives   • Vancomycin Rash         Current Outpatient Medications:   •  aspirin 81 MG EC tablet, Take 1 tablet by mouth Daily., Disp: 30 tablet, Rfl: 11  •  DULoxetine (CYMBALTA) 60 MG capsule, Daily., Disp: , Rfl:   •  famotidine (PEPCID) 20 MG tablet, Take 20 mg by mouth., Disp: , Rfl:   •  Insulin Lispro Prot & Lispro (humaLOG 75-25) (75-25) 100 UNIT/ML suspension pen-injector pen, INJECT 50 UNITS UNDER THE SKIN TWICE A DAY BEFORE MEALS, Disp: , Rfl:   •  losartan (COZAAR) 50 MG tablet, Take 2 tablets by mouth Daily. (Patient taking differently: Take 50 mg by mouth Daily.), Disp: 60 tablet, Rfl: 0  •  metoprolol tartrate (LOPRESSOR) 25 MG tablet, 25 mg Daily., Disp: , Rfl:   •  nitroglycerin (NITROSTAT) 0.4 MG SL tablet, 1 under the tongue as needed for angina, may repeat q5mins for up three doses, Disp: 25 tablet, Rfl: 1  •  prasugrel (EFFIENT) 10 MG tablet, Take 1 tablet by mouth once daily, Disp: 30 tablet, Rfl:  "0  •  vitamin D (ERGOCALCIFEROL) 1.25 MG (58403 UT) capsule capsule, Take 50,000 Units by mouth 1 (One) Time Per Week., Disp: , Rfl:       Objective:     Vitals:    11/30/22 1308   BP: 138/76   BP Location: Right arm   Pulse: 105   Weight: 118 kg (260 lb 6.4 oz)   Height: 160 cm (63\")     Body mass index is 46.13 kg/m².    Vitals reviewed.   Constitutional:       Appearance: Not in distress.      Comments: Obese   Eyes:      Conjunctiva/sclera: Conjunctivae normal.   HENT:      Head: Normocephalic.      Nose: Nose normal.         Comments: Masked  Neck:      Comments: Cannot assess for JVD due to habitus  Pulmonary:      Effort: Pulmonary effort is normal.      Breath sounds: Normal breath sounds.   Cardiovascular:      Normal rate. Regular rhythm.      Murmurs: There is no murmur.   Pulses:     Intact distal pulses.   Edema:     Peripheral edema absent.   Abdominal:      Palpations: Abdomen is soft.      Tenderness: There is no abdominal tenderness.      Comments: Obesity limits abdominal exam   Musculoskeletal: Normal range of motion.      Cervical back: Normal range of motion. Skin:     General: Skin is warm and dry.      Findings: No rash.   Neurological:      General: No focal deficit present.      Mental Status: Alert and oriented to person, place, and time.      Cranial Nerves: No cranial nerve deficit.   Psychiatric:         Behavior: Behavior normal.         Thought Content: Thought content normal.         Judgment: Judgment normal.         ECG 12 Lead    Date/Time: 11/30/2022 1:25 PM  Performed by: Johan Bunch MD  Authorized by: Johan Bunch MD   Comparison: compared with previous ECG   Similar to previous ECG  Rhythm: sinus tachycardia  Ectopy: atrial premature contractions  Conduction: conduction normal  ST Segments: ST segments normal  T Waves: T waves normal  QRS axis: normal  Other: no other findings    Clinical impression: non-specific ECG            Assessment:       Diagnosis Plan   1. " Coronary artery disease involving native coronary artery of native heart without angina pectoris        2. Mixed hyperlipidemia        3. Primary hypertension        4. Stage 3b chronic kidney disease (HCC)        5. Type 2 diabetes mellitus with diabetic polyneuropathy, with long-term current use of insulin (HCC)           Plan:     Coronary Artery Disease  Subjective - Objective  • There has been a previous stent procedure using NEDA 8/2021  • Current antiplatelet therapy includes aspirin 81 mg and prasugrel 10 mg    It's a bit hard to get to the bottom of things; while she may have had improvement in her anginal chest pain with PCI, but her fatigue and dyspnea have never improved. She was never able to enroll in cardiac rehab. I do think the predominant issue here is deconditioning. She endorses that she is profoundly sedentary.She   spends most of her time in a dark house. I am going to try to re-attempt to enroll her in rehab.     I did offer a repeat cath to make sure that rapidly progressive CAD is not the cause of her symptoms; she wants to hold off on this for now. I am continuing DAPT for now.     My recommendation would be that she be on a PCSK9 inhibitor and an SGLT2 inhibitor, but I will defer this to her endocrinologist.        Sincerely,       Johan Bunch MD

## 2022-12-01 RX ORDER — PRASUGREL 10 MG/1
TABLET, FILM COATED ORAL
Qty: 30 TABLET | Refills: 0 | Status: SHIPPED | OUTPATIENT
Start: 2022-12-01 | End: 2022-12-27

## 2022-12-27 RX ORDER — PRASUGREL 10 MG/1
TABLET, FILM COATED ORAL
Qty: 30 TABLET | Refills: 0 | Status: SHIPPED | OUTPATIENT
Start: 2022-12-27 | End: 2023-01-23 | Stop reason: SDUPTHER

## 2023-01-23 RX ORDER — PRASUGREL 10 MG/1
10 TABLET, FILM COATED ORAL DAILY
Qty: 90 TABLET | Refills: 1 | Status: SHIPPED | OUTPATIENT
Start: 2023-01-23

## 2023-05-31 RX ORDER — PRASUGREL 10 MG/1
10 TABLET, FILM COATED ORAL DAILY
Qty: 90 TABLET | Refills: 0 | Status: SHIPPED | OUTPATIENT
Start: 2023-05-31

## 2023-08-03 ENCOUNTER — TELEPHONE (OUTPATIENT)
Dept: ORTHOPEDIC SURGERY | Facility: CLINIC | Age: 68
End: 2023-08-03
Payer: MEDICARE

## 2023-09-01 ENCOUNTER — OFFICE VISIT (OUTPATIENT)
Dept: CARDIOLOGY | Facility: CLINIC | Age: 68
End: 2023-09-01
Payer: MEDICARE

## 2023-09-01 VITALS
HEIGHT: 63 IN | BODY MASS INDEX: 42.35 KG/M2 | HEART RATE: 89 BPM | SYSTOLIC BLOOD PRESSURE: 142 MMHG | WEIGHT: 239 LBS | DIASTOLIC BLOOD PRESSURE: 80 MMHG

## 2023-09-01 DIAGNOSIS — I10 PRIMARY HYPERTENSION: ICD-10-CM

## 2023-09-01 DIAGNOSIS — Z79.4 TYPE 2 DIABETES MELLITUS WITH DIABETIC POLYNEUROPATHY, WITH LONG-TERM CURRENT USE OF INSULIN: ICD-10-CM

## 2023-09-01 DIAGNOSIS — E66.01 MORBID OBESITY: ICD-10-CM

## 2023-09-01 DIAGNOSIS — I25.10 CORONARY ARTERY DISEASE INVOLVING NATIVE CORONARY ARTERY OF NATIVE HEART WITHOUT ANGINA PECTORIS: Primary | ICD-10-CM

## 2023-09-01 DIAGNOSIS — I49.1 APC (ATRIAL PREMATURE CONTRACTIONS): ICD-10-CM

## 2023-09-01 DIAGNOSIS — E78.2 MIXED HYPERLIPIDEMIA: ICD-10-CM

## 2023-09-01 DIAGNOSIS — E11.42 TYPE 2 DIABETES MELLITUS WITH DIABETIC POLYNEUROPATHY, WITH LONG-TERM CURRENT USE OF INSULIN: ICD-10-CM

## 2023-09-01 DIAGNOSIS — N18.32 STAGE 3B CHRONIC KIDNEY DISEASE: ICD-10-CM

## 2023-09-01 RX ORDER — KETOCONAZOLE 20 MG/G
1 CREAM TOPICAL 2 TIMES DAILY
COMMUNITY
Start: 2023-08-16

## 2023-09-01 RX ORDER — LOSARTAN POTASSIUM 100 MG/1
100 TABLET ORAL DAILY
Qty: 90 TABLET | Refills: 1 | Status: SHIPPED | OUTPATIENT
Start: 2023-09-01

## 2023-09-01 RX ORDER — PRASUGREL 10 MG/1
10 TABLET, FILM COATED ORAL DAILY
Qty: 90 TABLET | Refills: 3 | Status: SHIPPED | OUTPATIENT
Start: 2023-09-01

## 2023-09-01 NOTE — PROGRESS NOTES
Date of Office Visit: 2023  Encounter Provider: AUTUMN Phillips  Place of Service: [facility] CARDIOLOGY  Patient Name: Idalia Okeefe  :1955  Primary Cardiologist: Dr. Johan Bunch    Chief Complaint   Patient presents with    Annual Exam    Coronary Artery Disease   :     HPI: Idalia Okeefe is a 68 y.o. female who presents today for cardiac follow-up visit.  I reviewed her medical records.    She has been diagnosed with hypertension, hyperlipidemia, type 2 diabetes mellitus, neuropathy, chronic kidney disease stage III (creatinine 1.3-1.4, and obesity.  She has a left upper extremity AV fistula that she has never had to use.  She has been intolerant to statins.  She is a former cigarette smoker and quit in .    In 2021, she presented with dyspnea and chest pain.  She was found to have severe disease of the proximal LAD per cath treated with drug-eluting stent (3.0 x 18 mm Xience Carmen).  She was noted to have mild to moderate nonobstructive disease elsewhere with medical treatment recommended.  Despite revascularization, she continued to experience chest pain, fatigue and dyspnea.  Medication adjustments did not help.  Dr. Bunch recommended cardiac rehab.  She also offered her a repeat heart cath, but the patient wanted to hold off.    She presents today for follow-up visit and her  is accompanying her.  She recently fell on concrete after losing her balance and developed a hematoma to her left knee.  Her aspirin has been placed on hold.  She reports mild dyspnea with exertion when going up steps and occasional lightheadedness.  She denies chest pain, palpitations, syncope, bleeding.  Blood pressure elevated today.      Past Medical History:   Diagnosis Date    Acquired solitary kidney     Acute renal failure (ARF)     requiring temporary dialysis    APC (atrial premature contractions) 2023    AV fistula     LUE, never has been used    CAD  (coronary artery disease) 2021    90% prox LAD s/p 3x18mm Xience, LI of LCx, 40-50% OM, 30% mid/50% distal RCA    CKD (chronic kidney disease) stage 3, GFR 30-59 ml/min     Diabetic neuropathy     Hypertension     Mixed hyperlipidemia     Morbid obesity     Type 2 diabetes mellitus     Vitamin D deficiency        Past Surgical History:   Procedure Laterality Date    CARDIAC CATHETERIZATION N/A 2021    Left main small no disease, LAD 90% proximal lesion, circumflex luminal irregularities proximally, 40-50% ostial OM1 lesion, and RCA 20% proximal, 30% mid, and 50% distal.    CARDIAC CATHETERIZATION N/A 2021    Procedure: Left heart cath NO LV GRAM;  Surgeon: Albaro Woody MD;  Location:  [facility] CATH INVASIVE LOCATION;  Service: Cardiovascular;  Laterality: N/A;    CARDIAC CATHETERIZATION N/A 2021    Procedure: Stent NEDA coronary;  Surgeon: Albaro Woody MD;  Location:  [facility] CATH INVASIVE LOCATION;  Service: Cardiovascular;  Laterality: N/A;    CARDIAC CATHETERIZATION N/A 2021    Procedure: Optical Coherent Tomography;  Surgeon: Albaro Woody MD;  Location:  [facility] CATH INVASIVE LOCATION;  Service: Cardiovascular;  Laterality: N/A;    CORONARY STENT PLACEMENT  2021    3.0 x 18 mm Xience Carmen drug-eluting stent to the proximal LAD    HYSTERECTOMY      NEPHRECTOMY PARTIAL Left     TONSILLECTOMY AND ADENOIDECTOMY         Social History     Socioeconomic History    Marital status:    Tobacco Use    Smoking status: Former     Types: Cigarettes     Quit date: 2001     Years since quittin.0    Smokeless tobacco: Never   Vaping Use    Vaping Use: Never used   Substance and Sexual Activity    Alcohol use: Never    Drug use: Never    Sexual activity: Defer       Family History   Problem Relation Age of Onset    Colon cancer Mother     Heart valve disorder Mother         had a valve replacement    Coronary artery disease Father         first MI age 45    Coronary artery  disease Brother         CABG in his early 60s    Coronary artery disease Brother         PCIs in 60s       The following portion of the patient's history were reviewed and updated as appropriate: past medical history, past surgical history, past social history, past family history, allergies, current medications, and problem list.    Review of Systems   Constitutional: Negative.   Cardiovascular:  Positive for dyspnea on exertion (steps) and leg swelling.   Respiratory: Negative.     Hematologic/Lymphatic: Negative.    Neurological: Negative.      Allergies   Allergen Reactions    Barium-Containing Compounds Other (See Comments)     Low kidney function    Other reaction(s): Other (See Comments)   Low kidney function   Low kidney function    Other reaction(s): Other (See Comments)   Low kidney function   Low kidney function   Other reaction(s): Other (See Comments)   Low kidney function   Low kidney function    Contrast Dye (Echo Or Unknown Ct/Mr) Other (See Comments)     Pt has kidney issues.     Iodinated Contrast Media Other (See Comments)     Other reaction(s): Other (See Comments)    Pt has kidney issues.    Other reaction(s): Other (See Comments)   Pt has kidney issues.    Other reaction(s): Other (See Comments)   Pt has kidney issues.     Other reaction(s): Other (See Comments)   Pt has kidney issues.    Other reaction(s): Other (See Comments)   Pt has kidney issues.    Other reaction(s): Other (See Comments)   Pt has kidney issues.    Other reaction(s): Other (See Comments)   Pt has kidney issues.    Levofloxacin Hives    Vancomycin Rash         Current Outpatient Medications:     aspirin 81 MG EC tablet, Take 1 tablet by mouth Daily., Disp: 30 tablet, Rfl: 11    DULoxetine (CYMBALTA) 60 MG capsule, Daily., Disp: , Rfl:     famotidine (PEPCID) 20 MG tablet, Take 1 tablet by mouth., Disp: , Rfl:     Insulin Lispro Prot & Lispro (humaLOG 75-25) (75-25) 100 UNIT/ML suspension pen-injector pen, INJECT 50 UNITS  "UNDER THE SKIN TWICE A DAY BEFORE MEALS, Disp: , Rfl:     ketoconazole (NIZORAL) 2 % cream, Apply 1 application  topically to the appropriate area as directed 2 (Two) Times a Day., Disp: , Rfl:     losartan (COZAAR) 100 MG tablet, Take 1 tablet by mouth Daily., Disp: 90 tablet, Rfl: 1    metoprolol tartrate (LOPRESSOR) 25 MG tablet, 1 tablet Daily., Disp: , Rfl:     nitroglycerin (NITROSTAT) 0.4 MG SL tablet, 1 under the tongue as needed for angina, may repeat q5mins for up three doses, Disp: 25 tablet, Rfl: 1    prasugrel (EFFIENT) 10 MG tablet, Take 1 tablet by mouth Daily., Disp: 90 tablet, Rfl: 3    Semaglutide,0.25 or 0.5MG/DOS, (OZEMPIC) 2 MG/3ML solution pen-injector, Inject 0.5 mg under the skin into the appropriate area as directed Every 7 (Seven) Days., Disp: , Rfl:     vitamin D (ERGOCALCIFEROL) 1.25 MG (65433 UT) capsule capsule, Take 1 capsule by mouth 1 (One) Time Per Week., Disp: , Rfl:          Objective:     Vitals:    09/01/23 1434   BP: 142/80   BP Location: Right arm   Patient Position: Sitting   Cuff Size: Adult   Pulse: 89   Weight: 108 kg (239 lb)   Height: 160 cm (62.99\")     Body mass index is 42.35 kg/mý.    PHYSICAL EXAM:    Vitals Reviewed.   General Appearance: No acute distress, well developed and well nourished. Obese.    HENT: No hearing loss noted.    Respiratory: No signs of respiratory distress. Respiration rhythm and depth normal.  Clear to auscultation.   Cardiovascular:  Jugular Venous Pressure: Normal  Heart Rate and Rhythm: Normal, Heart Sounds: Normal S1 and S2. No S3 or S4 noted.  Murmurs: No murmurs noted. No rubs, thrills, or gallops.   Lower Extremities: Bilateral trace lower extremity edema noted.  Musculoskeletal: Normal movement of extremities.  Skin: General appearance normal.    Psychiatric: Patient alert and oriented to person, place, and time. Speech and behavior appropriate. Normal mood and affect.       ECG 12 Lead    Date/Time: 9/1/2023 2:28 PM  Performed by: " Elena Pal APRN  Authorized by: Elena Pal APRN   Comparison: compared with previous ECG from 11/30/2022  Comparison to previous ECG: Sinus tachycardia, APCs noted  Rhythm: sinus rhythm  Rate: normal  BPM: 89  Conduction: conduction normal  ST Segments: ST segments normal  T Waves: T waves normal  QRS axis: normal    Clinical impression: normal ECG          Assessment:       Diagnosis Plan   1. Coronary artery disease involving native coronary artery of native heart without angina pectoris        2. Primary hypertension        3. Stage 3b chronic kidney disease        4. Mixed hyperlipidemia        5. Type 2 diabetes mellitus with diabetic polyneuropathy, with long-term current use of insulin        6. Morbid obesity        7. APC (atrial premature contractions)               Plan:       1.  Coronary Artery Disease: Status post stent placement to the LAD in 2021.  Denies angina.  She recently fell and has a left knee hematoma.  Her aspirin is on hold and she will restart when able.  She will continue with the pressure grill.  Intolerant to statin therapy.    2.  Hypertension: Blood pressure elevated.  Increase losartan to 100 mg daily.  Monitor blood pressure at home and I will call for an update in September.    3.  Stage IIIb chronic kidney disease followed by nephrology.    4.  Hyperlipidemia: Intolerant to statins.    5.  Type 2 diabetes mellitus.  She would benefit from Farxiga or Jardiance and will defer to her PCP.    6.  Obesity. Body mass index is 42.35 kg/mý.     7.  APCs noted in the past.  Denies palpitations.    8.  I recommend a 1 year follow-up visit with Dr. Johan Bunch.    As always, it has been a pleasure to participate in your patient's care. Thank you.         Sincerely,         AUTUMN Ruth  [facility] Cardiology      Dictated utilizing Dragon Dictation  I spent 30 minutes reviewing her medical records/testing/previous office notes/labs, face-to-face  interaction with patient, physical examination, formulating the plan of care, and discussion of plan of care with patient.

## 2023-09-05 RX ORDER — PRASUGREL 10 MG/1
10 TABLET, FILM COATED ORAL DAILY
Qty: 90 TABLET | Refills: 3 | Status: SHIPPED | OUTPATIENT
Start: 2023-09-05

## 2023-10-23 ENCOUNTER — TELEPHONE (OUTPATIENT)
Dept: CARDIOLOGY | Facility: CLINIC | Age: 68
End: 2023-10-23
Payer: MEDICARE

## 2023-10-23 DIAGNOSIS — I10 PRIMARY HYPERTENSION: Primary | ICD-10-CM

## 2023-10-23 NOTE — TELEPHONE ENCOUNTER
In September 2023, blood pressure was elevated.  I increased losartan to 100 mg daily.  Please call to reassess her home blood pressure readings.  If blood pressures are running 130s/80s or less -blood pressures are under good control.    She also needs to have a repeat BMP at the main lab at the hospital and the order has been placed.     Thanks.

## 2023-10-24 NOTE — TELEPHONE ENCOUNTER
Spoke to patient's , allowed per ABDELRAHMAN. He said BPs have been in the 130s/80s. She will continue on her current dose and get lab work.     Arianne Chris RN  Triage MG

## 2023-11-18 ENCOUNTER — HOSPITAL ENCOUNTER (EMERGENCY)
Facility: HOSPITAL | Age: 68
Discharge: HOME | End: 2023-11-18
Attending: EMERGENCY MEDICINE
Payer: MEDICARE

## 2023-11-18 VITALS
HEART RATE: 115 BPM | OXYGEN SATURATION: 97 % | WEIGHT: 238.1 LBS | RESPIRATION RATE: 19 BRPM | HEIGHT: 62 IN | BODY MASS INDEX: 43.82 KG/M2 | DIASTOLIC BLOOD PRESSURE: 82 MMHG | TEMPERATURE: 97.5 F | SYSTOLIC BLOOD PRESSURE: 123 MMHG

## 2023-11-18 DIAGNOSIS — R19.7 DIARRHEA, UNSPECIFIED TYPE: Primary | ICD-10-CM

## 2023-11-18 LAB
ALBUMIN SERPL-MCNC: 4 G/DL (ref 3.5–5.2)
ALBUMIN/GLOB SERPL: 1.3 G/DL
ALP SERPL-CCNC: 73 U/L (ref 39–117)
ALT SERPL W P-5'-P-CCNC: 12 U/L (ref 1–33)
ANION GAP SERPL CALCULATED.3IONS-SCNC: 10.7 MMOL/L (ref 5–15)
AST SERPL-CCNC: 13 U/L (ref 1–32)
BACTERIA UR QL AUTO: ABNORMAL /HPF
BASOPHILS # BLD AUTO: 0.04 10*3/MM3 (ref 0–0.2)
BASOPHILS NFR BLD AUTO: 0.4 % (ref 0–1.5)
BILIRUB SERPL-MCNC: 0.4 MG/DL (ref 0–1.2)
BILIRUB UR QL STRIP: NEGATIVE
BUN SERPL-MCNC: 15 MG/DL (ref 8–23)
BUN/CREAT SERPL: 11.2 (ref 7–25)
CALCIUM SPEC-SCNC: 9.5 MG/DL (ref 8.6–10.5)
CHLORIDE SERPL-SCNC: 106 MMOL/L (ref 98–107)
CLARITY UR: ABNORMAL
CO2 SERPL-SCNC: 20.3 MMOL/L (ref 22–29)
COLOR UR: YELLOW
CREAT SERPL-MCNC: 1.34 MG/DL (ref 0.57–1)
D-LACTATE SERPL-SCNC: 1.9 MMOL/L (ref 0.5–2)
DEPRECATED RDW RBC AUTO: 41.1 FL (ref 37–54)
EGFRCR SERPLBLD CKD-EPI 2021: 43 ML/MIN/1.73
EOSINOPHIL # BLD AUTO: 0.21 10*3/MM3 (ref 0–0.4)
EOSINOPHIL NFR BLD AUTO: 2 % (ref 0.3–6.2)
ERYTHROCYTE [DISTWIDTH] IN BLOOD BY AUTOMATED COUNT: 13.4 % (ref 12.3–15.4)
FLUAV SUBTYP SPEC NAA+PROBE: NOT DETECTED
FLUBV RNA ISLT QL NAA+PROBE: NOT DETECTED
GLOBULIN UR ELPH-MCNC: 3 GM/DL
GLUCOSE SERPL-MCNC: 169 MG/DL (ref 65–99)
GLUCOSE UR STRIP-MCNC: NEGATIVE MG/DL
HCT VFR BLD AUTO: 41.4 % (ref 34–46.6)
HGB BLD-MCNC: 13.4 G/DL (ref 12–15.9)
HGB UR QL STRIP.AUTO: NEGATIVE
HOLD SPECIMEN: NORMAL
HOLD SPECIMEN: NORMAL
HYALINE CASTS UR QL AUTO: ABNORMAL /LPF
IMM GRANULOCYTES # BLD AUTO: 0.03 10*3/MM3 (ref 0–0.05)
IMM GRANULOCYTES NFR BLD AUTO: 0.3 % (ref 0–0.5)
KETONES UR QL STRIP: NEGATIVE
LEUKOCYTE ESTERASE UR QL STRIP.AUTO: NEGATIVE
LIPASE SERPL-CCNC: 31 U/L (ref 13–60)
LYMPHOCYTES # BLD AUTO: 3.29 10*3/MM3 (ref 0.7–3.1)
LYMPHOCYTES NFR BLD AUTO: 31.3 % (ref 19.6–45.3)
MAGNESIUM SERPL-MCNC: 1.9 MG/DL (ref 1.6–2.4)
MCH RBC QN AUTO: 27 PG (ref 26.6–33)
MCHC RBC AUTO-ENTMCNC: 32.4 G/DL (ref 31.5–35.7)
MCV RBC AUTO: 83.5 FL (ref 79–97)
MONOCYTES # BLD AUTO: 0.98 10*3/MM3 (ref 0.1–0.9)
MONOCYTES NFR BLD AUTO: 9.3 % (ref 5–12)
NEUTROPHILS NFR BLD AUTO: 5.96 10*3/MM3 (ref 1.7–7)
NEUTROPHILS NFR BLD AUTO: 56.7 % (ref 42.7–76)
NITRITE UR QL STRIP: NEGATIVE
NRBC BLD AUTO-RTO: 0 /100 WBC (ref 0–0.2)
PH UR STRIP.AUTO: 5.5 [PH] (ref 5–8)
PLATELET # BLD AUTO: 276 10*3/MM3 (ref 140–450)
PMV BLD AUTO: 9.3 FL (ref 6–12)
POTASSIUM SERPL-SCNC: 4.5 MMOL/L (ref 3.5–5.2)
PROT SERPL-MCNC: 7 G/DL (ref 6–8.5)
PROT UR QL STRIP: ABNORMAL
RBC # BLD AUTO: 4.96 10*6/MM3 (ref 3.77–5.28)
RBC # UR STRIP: ABNORMAL /HPF
REF LAB TEST METHOD: ABNORMAL
RSV RNA NPH QL NAA+NON-PROBE: NOT DETECTED
SARS-COV-2 RNA RESP QL NAA+PROBE: NOT DETECTED
SODIUM SERPL-SCNC: 137 MMOL/L (ref 136–145)
SP GR UR STRIP: 1.02 (ref 1–1.03)
SQUAMOUS #/AREA URNS HPF: ABNORMAL /HPF
UROBILINOGEN UR QL STRIP: ABNORMAL
WBC # UR STRIP: ABNORMAL /HPF
WBC NRBC COR # BLD AUTO: 10.51 10*3/MM3 (ref 3.4–10.8)
WHOLE BLOOD HOLD COAG: NORMAL
WHOLE BLOOD HOLD SPECIMEN: NORMAL

## 2023-11-18 PROCEDURE — 25810000003 SODIUM CHLORIDE 0.9 % SOLUTION

## 2023-11-18 PROCEDURE — 85025 COMPLETE CBC W/AUTO DIFF WBC: CPT

## 2023-11-18 PROCEDURE — 25010000002 DICYCLOMINE PER 20 MG: Performed by: EMERGENCY MEDICINE

## 2023-11-18 PROCEDURE — 83690 ASSAY OF LIPASE: CPT

## 2023-11-18 PROCEDURE — 83735 ASSAY OF MAGNESIUM: CPT

## 2023-11-18 PROCEDURE — 83605 ASSAY OF LACTIC ACID: CPT

## 2023-11-18 PROCEDURE — 81001 URINALYSIS AUTO W/SCOPE: CPT

## 2023-11-18 PROCEDURE — 80053 COMPREHEN METABOLIC PANEL: CPT

## 2023-11-18 PROCEDURE — 99283 EMERGENCY DEPT VISIT LOW MDM: CPT

## 2023-11-18 PROCEDURE — 96372 THER/PROPH/DIAG INJ SC/IM: CPT

## 2023-11-18 PROCEDURE — 36415 COLL VENOUS BLD VENIPUNCTURE: CPT

## 2023-11-18 PROCEDURE — 96374 THER/PROPH/DIAG INJ IV PUSH: CPT

## 2023-11-18 PROCEDURE — 87637 SARSCOV2&INF A&B&RSV AMP PRB: CPT

## 2023-11-18 RX ORDER — DICYCLOMINE HYDROCHLORIDE 10 MG/ML
20 INJECTION INTRAMUSCULAR ONCE
Status: COMPLETED | OUTPATIENT
Start: 2023-11-18 | End: 2023-11-18

## 2023-11-18 RX ORDER — FAMOTIDINE 10 MG/ML
20 INJECTION, SOLUTION INTRAVENOUS ONCE
Status: COMPLETED | OUTPATIENT
Start: 2023-11-18 | End: 2023-11-18

## 2023-11-18 RX ORDER — SODIUM CHLORIDE 0.9 % (FLUSH) 0.9 %
10 SYRINGE (ML) INJECTION AS NEEDED
Status: DISCONTINUED | OUTPATIENT
Start: 2023-11-18 | End: 2023-11-19 | Stop reason: HOSPADM

## 2023-11-18 RX ORDER — ONDANSETRON 4 MG/1
4 TABLET, ORALLY DISINTEGRATING ORAL EVERY 8 HOURS PRN
Qty: 15 TABLET | Refills: 0 | Status: SHIPPED | OUTPATIENT
Start: 2023-11-18

## 2023-11-18 RX ORDER — METRONIDAZOLE 500 MG/1
500 TABLET ORAL 3 TIMES DAILY
Qty: 21 TABLET | Refills: 0 | Status: SHIPPED | OUTPATIENT
Start: 2023-11-18

## 2023-11-18 RX ORDER — DICYCLOMINE HCL 20 MG
20 TABLET ORAL EVERY 6 HOURS
Qty: 20 TABLET | Refills: 0 | Status: SHIPPED | OUTPATIENT
Start: 2023-11-18

## 2023-11-18 RX ADMIN — DICYCLOMINE HYDROCHLORIDE 20 MG: 20 INJECTION, SOLUTION INTRAMUSCULAR at 22:25

## 2023-11-18 RX ADMIN — SODIUM CHLORIDE 1000 ML: 9 INJECTION, SOLUTION INTRAVENOUS at 21:56

## 2023-11-18 RX ADMIN — FAMOTIDINE 20 MG: 10 INJECTION INTRAVENOUS at 21:59

## 2023-11-18 NOTE — ED PROVIDER NOTES
Time: 5:52 PM EST  Date of encounter:  11/18/2023  Independent Historian/Clinical History and Information was obtained by:   Patient and Family    History is limited by: N/A    Chief Complaint   Patient presents with    Diarrhea         History of Present Illness:  Patient is a 68 y.o. year old female who presents to the emergency department for evaluation of diarrhea.  Patient is very tearful and emotional in triage providing little history.  Patient is coming with her  who states patient has had diarrhea for the past 2 days and is concerned that she may be dehydrated.  He states that she has felt warm at home but has not had a documented fever.  Patient is not having nausea or vomiting.  Patient is not had any recent sick exposures or abdominal antibiotics.  Patient has been voices concern for dehydration because she only has 1 kidney and is functioning 40%.  He states that she is very emotional right now because she is on multiple anxiety medications but has not been able to to take them due to feeling unwell.  (Provider in triage, Wilfred Snow PA-C)    Patient Care Team  Primary Care Provider: Christi Coppola MD    Past Medical History:     Allergies   Allergen Reactions    Barium-Containing Compounds Other (See Comments)     Low kidney function    Other reaction(s): Other (See Comments)   Low kidney function   Low kidney function    Other reaction(s): Other (See Comments)   Low kidney function   Low kidney function   Other reaction(s): Other (See Comments)   Low kidney function   Low kidney function    Contrast Dye (Echo Or Unknown Ct/Mr) Other (See Comments)     Pt has kidney issues.     Iodinated Contrast Media Other (See Comments)     Other reaction(s): Other (See Comments)    Pt has kidney issues.    Other reaction(s): Other (See Comments)   Pt has kidney issues.    Other reaction(s): Other (See Comments)   Pt has kidney issues.     Other reaction(s): Other (See Comments)   Pt has kidney issues.     Other reaction(s): Other (See Comments)   Pt has kidney issues.    Other reaction(s): Other (See Comments)   Pt has kidney issues.    Other reaction(s): Other (See Comments)   Pt has kidney issues.    Levofloxacin Hives    Vancomycin Rash     Past Medical History:   Diagnosis Date    Acquired solitary kidney     Acute renal failure (ARF)     requiring temporary dialysis    APC (atrial premature contractions) 09/01/2023    AV fistula     LUE, never has been used    CAD (coronary artery disease) 08/2021    90% prox LAD s/p 3x18mm Xience, LI of LCx, 40-50% OM, 30% mid/50% distal RCA    CKD (chronic kidney disease) stage 3, GFR 30-59 ml/min     Diabetic neuropathy     Hypertension     Mixed hyperlipidemia     Morbid obesity     Type 2 diabetes mellitus     Vitamin D deficiency      Past Surgical History:   Procedure Laterality Date    CARDIAC CATHETERIZATION N/A 8/30/2021    Left main small no disease, LAD 90% proximal lesion, circumflex luminal irregularities proximally, 40-50% ostial OM1 lesion, and RCA 20% proximal, 30% mid, and 50% distal.    CARDIAC CATHETERIZATION N/A 8/30/2021    Procedure: Left heart cath NO LV GRAM;  Surgeon: Albaro Woody MD;  Location: [facility] CATH INVASIVE LOCATION;  Service: Cardiovascular;  Laterality: N/A;    CARDIAC CATHETERIZATION N/A 8/30/2021    Procedure: Stent NEDA coronary;  Surgeon: Albaro Woody MD;  Location: [facility] CATH INVASIVE LOCATION;  Service: Cardiovascular;  Laterality: N/A;    CARDIAC CATHETERIZATION N/A 8/30/2021    Procedure: Optical Coherent Tomography;  Surgeon: Albaro Woody MD;  Location: [facility] CATH INVASIVE LOCATION;  Service: Cardiovascular;  Laterality: N/A;    CORONARY STENT PLACEMENT  08/30/2021    3.0 x 18 mm Xience Carmen drug-eluting stent to the proximal LAD    HYSTERECTOMY      NEPHRECTOMY PARTIAL Left     TONSILLECTOMY AND ADENOIDECTOMY       Family History   Problem Relation Age of Onset    Colon cancer Mother     Heart valve disorder Mother          had a valve replacement    Coronary artery disease Father         first MI age 45    Coronary artery disease Brother         CABG in his early 60s    Coronary artery disease Brother         PCIs in 60s       Home Medications:  Prior to Admission medications    Medication Sig Start Date End Date Taking? Authorizing Provider   aspirin 81 MG EC tablet Take 1 tablet by mouth Daily. 21   Meggan Conte APRN   DULoxetine (CYMBALTA) 60 MG capsule Daily. 8/3/21   Elizabeth Taylor MD   famotidine (PEPCID) 20 MG tablet Take 1 tablet by mouth.    Elizabeth Taylor MD   Insulin Lispro Prot & Lispro (humaLOG 75-25) (75-25) 100 UNIT/ML suspension pen-injector pen INJECT 50 UNITS UNDER THE SKIN TWICE A DAY BEFORE MEALS 21   Elizabeth Taylor MD   ketoconazole (NIZORAL) 2 % cream Apply 1 application  topically to the appropriate area as directed 2 (Two) Times a Day. 23   Elizabeth Taylor MD   losartan (COZAAR) 100 MG tablet Take 1 tablet by mouth Daily. 23   Elena Pal APRN   metoprolol tartrate (LOPRESSOR) 25 MG tablet 1 tablet Daily. 21   Elizabeth Taylor MD   nitroglycerin (NITROSTAT) 0.4 MG SL tablet 1 under the tongue as needed for angina, may repeat q5mins for up three doses 22   Johan Bunch MD   prasugrel (EFFIENT) 10 MG tablet TAKE 1 TABLET BY MOUTH DAILY 23   Johan Bunch MD   Semaglutide,0.25 or 0.5MG/DOS, (OZEMPIC) 2 MG/3ML solution pen-injector Inject 0.5 mg under the skin into the appropriate area as directed Every 7 (Seven) Days. 23   Elizabeth Taylor MD   vitamin D (ERGOCALCIFEROL) 1.25 MG (20824 UT) capsule capsule Take 1 capsule by mouth 1 (One) Time Per Week. 8/15/21   Elizabeth Taylor MD        Social History:   Social History     Tobacco Use    Smoking status: Former     Types: Cigarettes     Quit date: 2001     Years since quittin.2    Smokeless tobacco: Never   Vaping Use    Vaping Use: Never used   Substance  "Use Topics    Alcohol use: Never    Drug use: Never         Review of Systems:  Review of Systems   Constitutional:  Negative for chills and fever.   HENT:  Negative for congestion, rhinorrhea and sore throat.    Eyes:  Negative for pain and visual disturbance.   Respiratory:  Negative for apnea, cough, chest tightness and shortness of breath.    Cardiovascular:  Negative for chest pain and palpitations.   Gastrointestinal:  Positive for diarrhea. Negative for abdominal pain, nausea and vomiting.   Genitourinary:  Negative for difficulty urinating and dysuria.   Musculoskeletal:  Negative for joint swelling and myalgias.   Skin:  Negative for color change.   Neurological:  Negative for seizures and headaches.   Psychiatric/Behavioral:  The patient is nervous/anxious.    All other systems reviewed and are negative.       Physical Exam:  /82 (BP Location: Right arm, Patient Position: Sitting)   Pulse 115   Temp 97.5 °F (36.4 °C) (Oral)   Resp 19   Ht 157.5 cm (62\")   Wt 108 kg (238 lb 1.6 oz)   SpO2 97%   BMI 43.55 kg/m²         Physical Exam  Vitals and nursing note reviewed.   Constitutional:       General: She is not in acute distress.     Appearance: Normal appearance. She is ill-appearing. She is not toxic-appearing.   HENT:      Head: Normocephalic and atraumatic.      Jaw: There is normal jaw occlusion.      Mouth/Throat:      Mouth: Mucous membranes are moist.   Eyes:      General: Lids are normal.      Extraocular Movements: Extraocular movements intact.      Conjunctiva/sclera: Conjunctivae normal.      Pupils: Pupils are equal, round, and reactive to light.   Cardiovascular:      Rate and Rhythm: Normal rate and regular rhythm.      Pulses: Normal pulses.      Heart sounds: Normal heart sounds.   Pulmonary:      Effort: Pulmonary effort is normal. No respiratory distress.      Breath sounds: Normal breath sounds. No wheezing or rhonchi.   Abdominal:      General: Abdomen is flat. There is no " distension.      Palpations: Abdomen is soft.      Tenderness: There is no abdominal tenderness. There is no guarding or rebound.   Musculoskeletal:         General: Normal range of motion.      Cervical back: Normal range of motion and neck supple.      Right lower leg: No edema.      Left lower leg: No edema.   Skin:     General: Skin is warm and dry.   Neurological:      General: No focal deficit present.      Mental Status: She is alert and oriented to person, place, and time. Mental status is at baseline.   Psychiatric:         Mood and Affect: Mood normal. Affect is tearful.         Behavior: Behavior normal.                  Procedures:  Procedures      Medical Decision Making:      Comorbidities that affect care:    One kidney    External Notes reviewed:    Previous Clinic Note: Patient was last seen in clinic for knee pain after a fall      The following orders were placed and all results were independently analyzed by me:  Orders Placed This Encounter   Procedures    COVID-19, FLU A/B, RSV PCR 1 HR TAT - Swab, Nasopharynx    Enteric Bacterial Panel - Stool, Per Rectum    Clostridioides difficile Toxin - Stool, Per Rectum    Clostridioides difficile Toxin, PCR - Stool, Per Rectum    Magnesium    [place] Draw    Comprehensive Metabolic Panel    Lipase    Urinalysis With Microscopic If Indicated (No Culture) - Urine, Clean Catch    Lactic Acid, Plasma    CBC Auto Differential    Urinalysis, Microscopic Only - Urine, Clean Catch    NPO Diet NPO Type: Strict NPO    Undress & Gown    Insert Peripheral IV    CBC & Differential    Green Top (Gel)    Lavender Top    Gold Top - SST    Light Blue Top       Medications Given in the Emergency Department:  Medications   sodium chloride 0.9 % flush 10 mL (has no administration in time range)   sodium chloride 0.9 % bolus 1,000 mL (1,000 mL Intravenous New Bag 11/18/23 2156)   famotidine (PEPCID) injection 20 mg (20 mg Intravenous Given 11/18/23 2159)   dicyclomine  (BENTYL) injection 20 mg (20 mg Intramuscular Given 11/18/23 2225)        ED Course:    The patient was initially evaluated in the triage area where orders were placed. The patient was later dispositioned by Cassidy Zhang MD.      The patient was advised to stay for completion of workup which includes but is not limited to communication of labs and radiological results, reassessment and plan. The patient was advised that leaving prior to disposition by a provider could result in critical findings that are not communicated to the patient.     ED Course as of 11/18/23 2305   Sat Nov 18, 2023   1753 PROVIDER IN TRIAGE  Patient was evaluated by me in triage, Wilfred Snow PA-C.  Orders were placed and patient is currently awaiting final results and disposition.  [MD]      ED Course User Index  [MD] Wilfred Snow PA-C       Labs:    Lab Results (last 24 hours)       Procedure Component Value Units Date/Time    Magnesium [[id number]]  (Normal) Collected: 11/18/23 1801    Specimen: Blood from Arm, Right Updated: 11/18/23 1855     Magnesium 1.9 mg/dL     CBC & Differential [[id number]]  (Abnormal) Collected: 11/18/23 1801    Specimen: Blood from Arm, Right Updated: 11/18/23 1830    Narrative:      The following orders were created for panel order CBC & Differential.  Procedure                               Abnormality         Status                     ---------                               -----------         ------                     CBC Auto Differential[[id number]]        Abnormal            Final result                 Please view results for these tests on the individual orders.    Comprehensive Metabolic Panel [[id number]]  (Abnormal) Collected: 11/18/23 1801    Specimen: Blood from Arm, Right Updated: 11/18/23 1855     Glucose 169 mg/dL      BUN 15 mg/dL      Creatinine 1.34 mg/dL      Sodium 137 mmol/L      Potassium 4.5 mmol/L      Chloride 106 mmol/L      CO2 20.3 mmol/L      Calcium 9.5 mg/dL       Total Protein 7.0 g/dL      Albumin 4.0 g/dL      ALT (SGPT) 12 U/L      AST (SGOT) 13 U/L      Alkaline Phosphatase 73 U/L      Total Bilirubin 0.4 mg/dL      Globulin 3.0 gm/dL      A/G Ratio 1.3 g/dL      BUN/Creatinine Ratio 11.2     Anion Gap 10.7 mmol/L      eGFR 43.0 mL/min/1.73     Narrative:      GFR Normal >60  Chronic Kidney Disease <60  Kidney Failure <15      Lipase [[id number]]  (Normal) Collected: 11/18/23 1801    Specimen: Blood from Arm, Right Updated: 11/18/23 1855     Lipase 31 U/L     Lactic Acid, Plasma [[id number]]  (Normal) Collected: 11/18/23 1801    Specimen: Blood from Arm, Right Updated: 11/18/23 1844     Lactate 1.9 mmol/L     CBC Auto Differential [[id number]]  (Abnormal) Collected: 11/18/23 1801    Specimen: Blood from Arm, Right Updated: 11/18/23 1830     WBC 10.51 10*3/mm3      RBC 4.96 10*6/mm3      Hemoglobin 13.4 g/dL      Hematocrit 41.4 %      MCV 83.5 fL      MCH 27.0 pg      MCHC 32.4 g/dL      RDW 13.4 %      RDW-SD 41.1 fl      MPV 9.3 fL      Platelets 276 10*3/mm3      Neutrophil % 56.7 %      Lymphocyte % 31.3 %      Monocyte % 9.3 %      Eosinophil % 2.0 %      Basophil % 0.4 %      Immature Grans % 0.3 %      Neutrophils, Absolute 5.96 10*3/mm3      Lymphocytes, Absolute 3.29 10*3/mm3      Monocytes, Absolute 0.98 10*3/mm3      Eosinophils, Absolute 0.21 10*3/mm3      Basophils, Absolute 0.04 10*3/mm3      Immature Grans, Absolute 0.03 10*3/mm3      nRBC 0.0 /100 WBC     Urinalysis With Microscopic If Indicated (No Culture) - Urine, Clean Catch [[id number]]  (Abnormal) Collected: 11/18/23 1903    Specimen: Urine, Clean Catch Updated: 11/18/23 1914     Color, UA Yellow     Appearance, UA Cloudy     pH, UA 5.5     Specific Gravity, UA 1.024     Glucose, UA Negative     Ketones, UA Negative     Bilirubin, UA Negative     Blood, UA Negative     Protein, UA 30 mg/dL (1+)     Leuk Esterase, UA Negative     Nitrite, UA Negative     Urobilinogen, UA 0.2 E.U./dL    Urinalysis,  Microscopic Only - Urine, Clean Catch [[id number]]  (Abnormal) Collected: 11/18/23 1903    Specimen: Urine, Clean Catch Updated: 11/18/23 1914     RBC, UA 3-5 /HPF      WBC, UA 0-2 /HPF      Bacteria, UA 1+ /HPF      Squamous Epithelial Cells, UA 7-12 /HPF      Hyaline Casts, UA 7-12 /LPF      Methodology Automated Microscopy    COVID-19, FLU A/B, RSV PCR 1 HR TAT - Swab, Nasopharynx [[id number]]  (Normal) Collected: 11/18/23 2151    Specimen: Swab from Nasopharynx Updated: 11/18/23 2243     COVID19 Not Detected     Influenza A PCR Not Detected     Influenza B PCR Not Detected     RSV, PCR Not Detected    Narrative:      Fact sheet for providers: https://www.fda.gov/media/374415/download    Fact sheet for patients: https://www.fda.gov/media/168215/download    Test performed by PCR.             Imaging:    No Radiology Exams Resulted Within Past 24 Hours      Differential Diagnosis and Discussion:      Diarrhea: Differential diagnosis includes but is not limited to malabsorption syndrome, bacterial infection, carcinoid syndrome, pancreatic hypersecretion, viral infection, celiac sprue, Crohn's disease, ulcerative colitis, ischemic colitis, colitis, hypermotility, and irritable bowel syndrome.    All labs were reviewed and interpreted by me.    MDM     Amount and/or Complexity of Data Reviewed  Clinical lab tests: reviewed  Decide to obtain previous medical records or to obtain history from someone other than the patient: yes       The patient presents with vomiting and diarrhea consistent with gastroenteritis.  The patient´s CBC that was reviewed and interpreted by me shows no abnormalities of critical concern. Of note, there is no anemia requiring a blood transfusion and the platelet count is acceptable.  The patient´s CMP that was reviewed and interpretted by me shows no abnormalities of critical concern. Of note, the patient´s sodium and potassium are acceptable. The patient´s liver enzymes are unremarkable. The  patient´s renal function (creatinine) is preserved. The patient has a normal anion gap.  The patient has a soft and benign abdominal exam. The patient is now resting comfortably and feels better, is alert, and is in no distress. The patient is able to tolerate po intake in the ED. The patient´s labs were reviewed and hydration status was assessed. The patient has no signs of acute renal failure, sepsis, or dehydration that warrants admission to the hospital. The vital signs have been stable. The patient's condition is stable and appropriate for discharge. The patient will pursue further outpatient evaluation with the primary care physician or designated physician. The patient and/or caregivers have expressed a clear and thorough understanding and agreed to follow-up as instructed.          Patient Care Considerations:    CT ABDOMEN AND PELVIS: I considered ordering a CT scan of the abdomen and pelvis however abdomen is soft and nontender.      Consultants/Shared Management Plan:    None    Social Determinants of Health:    Patient is independent, reliable, and has access to care.       Disposition and Care Coordination:    Discharged: I considered escalation of care by admitting this patient for observation, however the patient has improved and is suitable and  stable for discharge.    I have explained the patient´s condition, diagnoses and treatment plan based on the information available to me at this time. I have answered questions and addressed any concerns. The patient has a good  understanding of the patient´s diagnosis, condition, and treatment plan as can be expected at this point. The vital signs have been stable. The patient´s condition is stable and appropriate for discharge from the emergency department.      The patient will pursue further outpatient evaluation with the primary care physician or other designated or consulting physician as outlined in the discharge instructions. They are agreeable to  this plan of care and follow-up instructions have been explained in detail. The patient has received these instructions in written format and have expressed an understanding of the discharge instructions. The patient is aware that any significant change in condition or worsening of symptoms should prompt an immediate return to this or the closest emergency department or call to 911.  I have explained discharge medications and the need for follow up with the patient/caretakers. This was also printed in the discharge instructions. Patient was discharged with the following medications and follow up:      Medication List        New Prescriptions      dicyclomine 20 MG tablet  Commonly known as: BENTYL  Take 1 tablet by mouth Every 6 (Six) Hours.     metroNIDAZOLE 500 MG tablet  Commonly known as: FLAGYL  Take 1 tablet by mouth 3 (Three) Times a Day.     ondansetron ODT 4 MG disintegrating tablet  Commonly known as: ZOFRAN-ODT  Place 1 tablet on the tongue Every 8 (Eight) Hours As Needed for Nausea or Vomiting.               Where to Get Your Medications        These medications were sent to [facility]/pharmacy #[id number] - [place], [place] - [address] - [phone] [facility] [phone]   [address] [place] [address]      Hours: 24-hours Phone: [phone]   dicyclomine 20 MG tablet  metroNIDAZOLE 500 MG tablet  ondansetron ODT 4 MG disintegrating tablet      Christi Coppola MD  [address] [id number] [phone]    In 2 days         Final diagnoses:   Diarrhea, unspecified type        ED Disposition       ED Disposition   Discharge    Condition   Stable    Comment   --               This medical record created using voice recognition software.             Cassidy Zhang MD  11/18/23 3246

## 2023-12-06 ENCOUNTER — LAB (OUTPATIENT)
Dept: LAB | Facility: HOSPITAL | Age: 68
End: 2023-12-06
Payer: MEDICARE

## 2023-12-06 ENCOUNTER — TRANSCRIBE ORDERS (OUTPATIENT)
Dept: ADMINISTRATIVE | Facility: HOSPITAL | Age: 68
End: 2023-12-06
Payer: MEDICARE

## 2023-12-06 DIAGNOSIS — N18.30 STAGE 3 CHRONIC KIDNEY DISEASE, UNSPECIFIED WHETHER STAGE 3A OR 3B CKD: Primary | ICD-10-CM

## 2023-12-06 DIAGNOSIS — N18.30 STAGE 3 CHRONIC KIDNEY DISEASE, UNSPECIFIED WHETHER STAGE 3A OR 3B CKD: ICD-10-CM

## 2023-12-06 DIAGNOSIS — E55.9 VITAMIN D DEFICIENCY DISEASE: ICD-10-CM

## 2023-12-06 DIAGNOSIS — I10 PRIMARY HYPERTENSION: ICD-10-CM

## 2023-12-06 LAB
25(OH)D3 SERPL-MCNC: 73.1 NG/ML (ref 30–100)
ALBUMIN SERPL-MCNC: 4.3 G/DL (ref 3.5–5.2)
ANION GAP SERPL CALCULATED.3IONS-SCNC: 11 MMOL/L (ref 5–15)
BACTERIA UR QL AUTO: ABNORMAL /HPF
BASOPHILS # BLD AUTO: 0.07 10*3/MM3 (ref 0–0.2)
BASOPHILS NFR BLD AUTO: 0.6 % (ref 0–1.5)
BILIRUB UR QL STRIP: NEGATIVE
BUN SERPL-MCNC: 18 MG/DL (ref 8–23)
BUN/CREAT SERPL: 13.8 (ref 7–25)
CALCIUM SPEC-SCNC: 10.1 MG/DL (ref 8.6–10.5)
CHLORIDE SERPL-SCNC: 104 MMOL/L (ref 98–107)
CLARITY UR: CLEAR
CO2 SERPL-SCNC: 24 MMOL/L (ref 22–29)
COD CRY URNS QL: ABNORMAL /HPF
COLOR UR: YELLOW
CREAT SERPL-MCNC: 1.3 MG/DL (ref 0.57–1)
CREAT UR-MCNC: 211.7 MG/DL
DEPRECATED RDW RBC AUTO: 40.6 FL (ref 37–54)
EGFRCR SERPLBLD CKD-EPI 2021: 44.6 ML/MIN/1.73
EOSINOPHIL # BLD AUTO: 0.35 10*3/MM3 (ref 0–0.4)
EOSINOPHIL NFR BLD AUTO: 3.1 % (ref 0.3–6.2)
ERYTHROCYTE [DISTWIDTH] IN BLOOD BY AUTOMATED COUNT: 14 % (ref 12.3–15.4)
GLUCOSE SERPL-MCNC: 145 MG/DL (ref 65–99)
GLUCOSE UR STRIP-MCNC: NEGATIVE MG/DL
HCT VFR BLD AUTO: 40.1 % (ref 34–46.6)
HGB BLD-MCNC: 13.6 G/DL (ref 12–15.9)
HGB UR QL STRIP.AUTO: NEGATIVE
HYALINE CASTS UR QL AUTO: ABNORMAL /LPF
IMM GRANULOCYTES # BLD AUTO: 0.05 10*3/MM3 (ref 0–0.05)
IMM GRANULOCYTES NFR BLD AUTO: 0.4 % (ref 0–0.5)
KETONES UR QL STRIP: NEGATIVE
LEUKOCYTE ESTERASE UR QL STRIP.AUTO: NEGATIVE
LYMPHOCYTES # BLD AUTO: 4.21 10*3/MM3 (ref 0.7–3.1)
LYMPHOCYTES NFR BLD AUTO: 37.8 % (ref 19.6–45.3)
MCH RBC QN AUTO: 27.8 PG (ref 26.6–33)
MCHC RBC AUTO-ENTMCNC: 33.9 G/DL (ref 31.5–35.7)
MCV RBC AUTO: 82 FL (ref 79–97)
MONOCYTES # BLD AUTO: 0.87 10*3/MM3 (ref 0.1–0.9)
MONOCYTES NFR BLD AUTO: 7.8 % (ref 5–12)
NEUTROPHILS NFR BLD AUTO: 5.6 10*3/MM3 (ref 1.7–7)
NEUTROPHILS NFR BLD AUTO: 50.3 % (ref 42.7–76)
NITRITE UR QL STRIP: NEGATIVE
NRBC BLD AUTO-RTO: 0 /100 WBC (ref 0–0.2)
PH UR STRIP.AUTO: 5.5 [PH] (ref 5–8)
PHOSPHATE SERPL-MCNC: 3.5 MG/DL (ref 2.5–4.5)
PLATELET # BLD AUTO: 302 10*3/MM3 (ref 140–450)
PMV BLD AUTO: 9.2 FL (ref 6–12)
POTASSIUM SERPL-SCNC: 4 MMOL/L (ref 3.5–5.2)
PROT ?TM UR-MCNC: 18.4 MG/DL
PROT UR QL STRIP: ABNORMAL
PROT/CREAT UR: 0.09 MG/G{CREAT}
PTH-INTACT SERPL-MCNC: 25.9 PG/ML (ref 15–65)
RBC # BLD AUTO: 4.89 10*6/MM3 (ref 3.77–5.28)
RBC # UR STRIP: ABNORMAL /HPF
REF LAB TEST METHOD: ABNORMAL
SODIUM SERPL-SCNC: 139 MMOL/L (ref 136–145)
SP GR UR STRIP: 1.02 (ref 1–1.03)
SQUAMOUS #/AREA URNS HPF: ABNORMAL /HPF
UROBILINOGEN UR QL STRIP: ABNORMAL
WBC # UR STRIP: ABNORMAL /HPF
WBC NRBC COR # BLD AUTO: 11.15 10*3/MM3 (ref 3.4–10.8)

## 2023-12-06 PROCEDURE — 36415 COLL VENOUS BLD VENIPUNCTURE: CPT

## 2023-12-06 PROCEDURE — 85025 COMPLETE CBC W/AUTO DIFF WBC: CPT

## 2023-12-06 PROCEDURE — 82570 ASSAY OF URINE CREATININE: CPT

## 2023-12-06 PROCEDURE — 84156 ASSAY OF PROTEIN URINE: CPT

## 2023-12-06 PROCEDURE — 80069 RENAL FUNCTION PANEL: CPT

## 2023-12-06 PROCEDURE — 81001 URINALYSIS AUTO W/SCOPE: CPT

## 2023-12-06 PROCEDURE — 82306 VITAMIN D 25 HYDROXY: CPT

## 2023-12-06 PROCEDURE — 83970 ASSAY OF PARATHORMONE: CPT

## 2023-12-08 ENCOUNTER — TELEPHONE (OUTPATIENT)
Dept: CARDIOLOGY | Facility: HOSPITAL | Age: 68
End: 2023-12-08
Payer: MEDICARE

## 2023-12-08 ENCOUNTER — TELEPHONE (OUTPATIENT)
Dept: CARDIOLOGY | Facility: CLINIC | Age: 68
End: 2023-12-08
Payer: MEDICARE

## 2023-12-08 NOTE — TELEPHONE ENCOUNTER
Called and left VM, will continue to try to reach pt.    HUB- please put pt straight through to triage    Gissel Becerra, RN  Triage RN  12/08/23 08:46 EST

## 2023-12-08 NOTE — TELEPHONE ENCOUNTER
Called and left vm for return call on # listed that stated it was Josue's(spouse) vm.    Gely To RN  [facility]         ----- Message from AUTUMN Pelaez sent at 12/7/2023  5:54 PM EST -----  Please call patient.  Renal function panel may have been ordered by her nephrologist.  I was wanting to reassess her kidney function.  Appears that her kidney function is stable at 1.3, mildly elevated.  Continue current medications.  Blood sugar elevated at 145 and I will defer to her PCP and/or her endocrinologist.  Please keep follow-up appointment with Dr. Johan Bunch in September 2024.  Thank you.

## 2023-12-08 NOTE — TELEPHONE ENCOUNTER
----- Message from AUTUMN Pelaez sent at 12/7/2023  5:54 PM EST -----  Please call patient.  Renal function panel may have been ordered by her nephrologist.  I was wanting to reassess her kidney function.  Appears that her kidney function is stable at 1.3, mildly elevated.  Continue current medications.  Blood sugar elevated at 145 and I will defer to her PCP and/or her endocrinologist.  Please keep follow-up appointment with Dr. Johan Bunch in September 2024.  Thank you.

## 2023-12-11 ENCOUNTER — TELEPHONE (OUTPATIENT)
Dept: CARDIOLOGY | Facility: CLINIC | Age: 68
End: 2023-12-11
Payer: MEDICARE

## 2023-12-11 NOTE — TELEPHONE ENCOUNTER
[facility] staff attempted to follow warm transfer process and was unsuccessful     Caller: Josue Okeefe    Relationship to patient: Emergency Contact    Best call back number:     [phone]       Patient is needing: [facility] ATTEMPTED TO CALL TRIAGE NURSE IN REGARDS TO THE PT SPOUSE RETURNING A CALL BACK FROM THE OFFICE. [facility] WAS UNABLE TO REACH THE OFFICE AS WELL AS TRIAGE NURSE. PT SPOUSE WOULD LIKE TO RECEIVE A CALL BACK.

## 2023-12-11 NOTE — TELEPHONE ENCOUNTER
Called and left VM, will continue to try to reach pt.     HUB- please put pt straight through to triage        Gissel Becerra, RN  Triage RN  12/11/23 09:36 EST

## 2023-12-12 NOTE — TELEPHONE ENCOUNTER
Results and recommendations called to pt.  Instructed to call with any further questions or concerns.  Verbalized understanding.    Gissel Becerra RN  Triage Nurse, [facility] [place] [place]  12/12/23 09:27 EST

## 2023-12-12 NOTE — TELEPHONE ENCOUNTER
Gissel Becerra RN CM    12/12/23  9:28 AM  Note     Results and recommendations called to pt.  Instructed to call with any further questions or concerns.  Verbalized understanding.     Gissel Becerra RN  Triage Nurse, [facility] [place]  12/12/23 09:27 EST

## 2024-04-03 RX ORDER — NITROGLYCERIN 0.4 MG/1
TABLET SUBLINGUAL
Qty: 25 TABLET | Refills: 0 | Status: SHIPPED | OUTPATIENT
Start: 2024-04-03

## 2024-07-23 RX ORDER — PRASUGREL 10 MG/1
10 TABLET, FILM COATED ORAL DAILY
Qty: 90 TABLET | Refills: 1 | Status: SHIPPED | OUTPATIENT
Start: 2024-07-23

## 2024-08-02 RX ORDER — LOSARTAN POTASSIUM 100 MG/1
100 TABLET ORAL DAILY
Qty: 90 TABLET | Refills: 3 | Status: SHIPPED | OUTPATIENT
Start: 2024-08-02

## 2024-10-18 RX ORDER — METOPROLOL TARTRATE 25 MG/1
25 TABLET, FILM COATED ORAL DAILY
Qty: 90 TABLET | Refills: 1 | Status: SHIPPED | OUTPATIENT
Start: 2024-10-18

## 2024-12-09 ENCOUNTER — TRANSCRIBE ORDERS (OUTPATIENT)
Dept: LAB | Facility: HOSPITAL | Age: 69
End: 2024-12-09
Payer: MEDICARE

## 2024-12-09 DIAGNOSIS — R80.1 PERSISTENT PROTEINURIA: ICD-10-CM

## 2024-12-09 DIAGNOSIS — I25.10 DISEASE OF CARDIOVASCULAR SYSTEM: ICD-10-CM

## 2024-12-09 DIAGNOSIS — E21.1 SECONDARY HYPERPARATHYROIDISM, NON-RENAL: ICD-10-CM

## 2024-12-09 DIAGNOSIS — E55.9 VITAMIN D DEFICIENCY, UNSPECIFIED: ICD-10-CM

## 2024-12-09 DIAGNOSIS — E11.22 TYPE 2 DIABETES MELLITUS WITH DIABETIC CHRONIC KIDNEY DISEASE, UNSPECIFIED CKD STAGE, UNSPECIFIED WHETHER LONG TERM INSULIN USE: ICD-10-CM

## 2024-12-09 DIAGNOSIS — N18.30 STAGE 3 CHRONIC KIDNEY DISEASE, UNSPECIFIED WHETHER STAGE 3A OR 3B CKD: Primary | ICD-10-CM

## 2024-12-09 DIAGNOSIS — I10 ESSENTIAL HYPERTENSION, MALIGNANT: ICD-10-CM

## 2024-12-09 DIAGNOSIS — N39.0 URINARY TRACT INFECTION WITHOUT HEMATURIA, SITE UNSPECIFIED: ICD-10-CM

## 2024-12-11 ENCOUNTER — LAB (OUTPATIENT)
Dept: LAB | Facility: HOSPITAL | Age: 69
End: 2024-12-11
Payer: MEDICARE

## 2024-12-11 DIAGNOSIS — I25.10 DISEASE OF CARDIOVASCULAR SYSTEM: ICD-10-CM

## 2024-12-11 DIAGNOSIS — E21.1 SECONDARY HYPERPARATHYROIDISM, NON-RENAL: ICD-10-CM

## 2024-12-11 DIAGNOSIS — I10 ESSENTIAL HYPERTENSION, MALIGNANT: ICD-10-CM

## 2024-12-11 DIAGNOSIS — E11.22 TYPE 2 DIABETES MELLITUS WITH DIABETIC CHRONIC KIDNEY DISEASE, UNSPECIFIED CKD STAGE, UNSPECIFIED WHETHER LONG TERM INSULIN USE: ICD-10-CM

## 2024-12-11 DIAGNOSIS — N39.0 URINARY TRACT INFECTION WITHOUT HEMATURIA, SITE UNSPECIFIED: ICD-10-CM

## 2024-12-11 DIAGNOSIS — E55.9 VITAMIN D DEFICIENCY, UNSPECIFIED: ICD-10-CM

## 2024-12-11 DIAGNOSIS — N18.30 STAGE 3 CHRONIC KIDNEY DISEASE, UNSPECIFIED WHETHER STAGE 3A OR 3B CKD: ICD-10-CM

## 2024-12-11 DIAGNOSIS — R80.1 PERSISTENT PROTEINURIA: ICD-10-CM

## 2024-12-11 LAB
25(OH)D3 SERPL-MCNC: 65.6 NG/ML (ref 30–100)
ALBUMIN SERPL-MCNC: 3.9 G/DL (ref 3.5–5.2)
ANION GAP SERPL CALCULATED.3IONS-SCNC: 10 MMOL/L (ref 5–15)
BACTERIA UR QL AUTO: ABNORMAL /HPF
BASOPHILS # BLD AUTO: 0.05 10*3/MM3 (ref 0–0.2)
BASOPHILS NFR BLD AUTO: 0.7 % (ref 0–1.5)
BILIRUB UR QL STRIP: NEGATIVE
BUN SERPL-MCNC: 16 MG/DL (ref 8–23)
BUN/CREAT SERPL: 9.1 (ref 7–25)
CALCIUM SPEC-SCNC: 9.6 MG/DL (ref 8.6–10.5)
CHLORIDE SERPL-SCNC: 109 MMOL/L (ref 98–107)
CLARITY UR: CLEAR
CO2 SERPL-SCNC: 21 MMOL/L (ref 22–29)
COLOR UR: YELLOW
CREAT SERPL-MCNC: 1.75 MG/DL (ref 0.57–1)
CREAT UR-MCNC: 261.2 MG/DL
DEPRECATED RDW RBC AUTO: 39.7 FL (ref 37–54)
EGFRCR SERPLBLD CKD-EPI 2021: 31 ML/MIN/1.73
EOSINOPHIL # BLD AUTO: 0.22 10*3/MM3 (ref 0–0.4)
EOSINOPHIL NFR BLD AUTO: 3.2 % (ref 0.3–6.2)
ERYTHROCYTE [DISTWIDTH] IN BLOOD BY AUTOMATED COUNT: 13.1 % (ref 12.3–15.4)
GLUCOSE SERPL-MCNC: 162 MG/DL (ref 65–99)
GLUCOSE UR STRIP-MCNC: ABNORMAL MG/DL
HCT VFR BLD AUTO: 42.6 % (ref 34–46.6)
HGB BLD-MCNC: 14.4 G/DL (ref 12–15.9)
HGB UR QL STRIP.AUTO: NEGATIVE
HYALINE CASTS UR QL AUTO: ABNORMAL /LPF
IMM GRANULOCYTES # BLD AUTO: 0.02 10*3/MM3 (ref 0–0.05)
IMM GRANULOCYTES NFR BLD AUTO: 0.3 % (ref 0–0.5)
KETONES UR QL STRIP: ABNORMAL
LEUKOCYTE ESTERASE UR QL STRIP.AUTO: NEGATIVE
LYMPHOCYTES # BLD AUTO: 2.87 10*3/MM3 (ref 0.7–3.1)
LYMPHOCYTES NFR BLD AUTO: 42 % (ref 19.6–45.3)
MCH RBC QN AUTO: 28.4 PG (ref 26.6–33)
MCHC RBC AUTO-ENTMCNC: 33.8 G/DL (ref 31.5–35.7)
MCV RBC AUTO: 84 FL (ref 79–97)
MONOCYTES # BLD AUTO: 0.57 10*3/MM3 (ref 0.1–0.9)
MONOCYTES NFR BLD AUTO: 8.3 % (ref 5–12)
NEUTROPHILS NFR BLD AUTO: 3.1 10*3/MM3 (ref 1.7–7)
NEUTROPHILS NFR BLD AUTO: 45.5 % (ref 42.7–76)
NITRITE UR QL STRIP: NEGATIVE
NRBC BLD AUTO-RTO: 0 /100 WBC (ref 0–0.2)
PH UR STRIP.AUTO: 5.5 [PH] (ref 5–8)
PHOSPHATE SERPL-MCNC: 2.8 MG/DL (ref 2.5–4.5)
PLATELET # BLD AUTO: 260 10*3/MM3 (ref 140–450)
PMV BLD AUTO: 9 FL (ref 6–12)
POTASSIUM SERPL-SCNC: 4.7 MMOL/L (ref 3.5–5.2)
PROT ?TM UR-MCNC: 23 MG/DL
PROT UR QL STRIP: ABNORMAL
PROT/CREAT UR: 0.09 MG/G{CREAT}
PTH-INTACT SERPL-MCNC: 36.6 PG/ML (ref 15–65)
RBC # BLD AUTO: 5.07 10*6/MM3 (ref 3.77–5.28)
RBC # UR STRIP: ABNORMAL /HPF
REF LAB TEST METHOD: ABNORMAL
SODIUM SERPL-SCNC: 140 MMOL/L (ref 136–145)
SP GR UR STRIP: >1.03 (ref 1–1.03)
SQUAMOUS #/AREA URNS HPF: ABNORMAL /HPF
UROBILINOGEN UR QL STRIP: ABNORMAL
WBC # UR STRIP: ABNORMAL /HPF
WBC NRBC COR # BLD AUTO: 6.83 10*3/MM3 (ref 3.4–10.8)

## 2024-12-11 PROCEDURE — 80069 RENAL FUNCTION PANEL: CPT

## 2024-12-11 PROCEDURE — 85025 COMPLETE CBC W/AUTO DIFF WBC: CPT

## 2024-12-11 PROCEDURE — 83970 ASSAY OF PARATHORMONE: CPT

## 2024-12-11 PROCEDURE — 82570 ASSAY OF URINE CREATININE: CPT

## 2024-12-11 PROCEDURE — 36415 COLL VENOUS BLD VENIPUNCTURE: CPT

## 2024-12-11 PROCEDURE — 81001 URINALYSIS AUTO W/SCOPE: CPT

## 2024-12-11 PROCEDURE — 82306 VITAMIN D 25 HYDROXY: CPT

## 2024-12-11 PROCEDURE — 84156 ASSAY OF PROTEIN URINE: CPT

## 2025-01-09 RX ORDER — PRASUGREL 10 MG/1
10 TABLET, FILM COATED ORAL DAILY
Qty: 90 TABLET | Refills: 0 | Status: SHIPPED | OUTPATIENT
Start: 2025-01-09

## 2025-03-17 RX ORDER — PRASUGREL 10 MG/1
10 TABLET, FILM COATED ORAL DAILY
Qty: 90 TABLET | Refills: 1 | Status: SHIPPED | OUTPATIENT
Start: 2025-03-17

## 2025-04-08 RX ORDER — METOPROLOL TARTRATE 25 MG/1
25 TABLET, FILM COATED ORAL DAILY
Qty: 90 TABLET | Refills: 0 | Status: SHIPPED | OUTPATIENT
Start: 2025-04-08

## 2025-05-30 NOTE — PROGRESS NOTES
RM:________     PCP: Christi Coppola MD    : 1955  AGE: 70 y.o.  EST PATIENT           Wt Readings from Last 3 Encounters:   23 108 kg (238 lb 1.6 oz)   23 108 kg (239 lb)   23 110 kg (242 lb)           CP______  SOA_______ DIZZINESS _____ FATIGUE ______  PALPS ______    WT: ____________ BP: __________L __________R HR______    ALLERGIES:Barium-containing compounds, Contrast dye (echo or unknown ct/mr), Iodinated contrast media, Levofloxacin, and Vancomycin      Social History     Tobacco Use    Smoking status: Former     Current packs/day: 0.00     Types: Cigarettes     Quit date: 2001     Years since quittin.7    Smokeless tobacco: Never   Vaping Use    Vaping status: Never Used   Substance Use Topics    Alcohol use: Never    Drug use: Never

## 2025-06-02 ENCOUNTER — OFFICE VISIT (OUTPATIENT)
Dept: CARDIOLOGY | Age: 70
End: 2025-06-02
Payer: MEDICARE

## 2025-06-02 VITALS
BODY MASS INDEX: 38.59 KG/M2 | HEIGHT: 63 IN | WEIGHT: 217.8 LBS | HEART RATE: 82 BPM | DIASTOLIC BLOOD PRESSURE: 62 MMHG | SYSTOLIC BLOOD PRESSURE: 110 MMHG

## 2025-06-02 DIAGNOSIS — E78.2 MIXED HYPERLIPIDEMIA: ICD-10-CM

## 2025-06-02 DIAGNOSIS — I10 PRIMARY HYPERTENSION: ICD-10-CM

## 2025-06-02 DIAGNOSIS — N18.32 STAGE 3B CHRONIC KIDNEY DISEASE: ICD-10-CM

## 2025-06-02 DIAGNOSIS — I25.10 CORONARY ARTERY DISEASE INVOLVING NATIVE CORONARY ARTERY OF NATIVE HEART WITHOUT ANGINA PECTORIS: Primary | ICD-10-CM

## 2025-06-02 DIAGNOSIS — I49.1 APC (ATRIAL PREMATURE CONTRACTIONS): ICD-10-CM

## 2025-06-02 PROCEDURE — 93000 ELECTROCARDIOGRAM COMPLETE: CPT | Performed by: INTERNAL MEDICINE

## 2025-06-02 PROCEDURE — 1159F MED LIST DOCD IN RCRD: CPT | Performed by: INTERNAL MEDICINE

## 2025-06-02 PROCEDURE — 3074F SYST BP LT 130 MM HG: CPT | Performed by: INTERNAL MEDICINE

## 2025-06-02 PROCEDURE — 1160F RVW MEDS BY RX/DR IN RCRD: CPT | Performed by: INTERNAL MEDICINE

## 2025-06-02 PROCEDURE — 3078F DIAST BP <80 MM HG: CPT | Performed by: INTERNAL MEDICINE

## 2025-06-02 PROCEDURE — 99214 OFFICE O/P EST MOD 30 MIN: CPT | Performed by: INTERNAL MEDICINE

## 2025-06-02 RX ORDER — EMPAGLIFLOZIN 25 MG/1
25 TABLET, FILM COATED ORAL DAILY
COMMUNITY
Start: 2025-03-07

## 2025-06-02 RX ORDER — TIRZEPATIDE 10 MG/.5ML
10 INJECTION, SOLUTION SUBCUTANEOUS
COMMUNITY
Start: 2025-03-17

## 2025-06-02 RX ORDER — FLUCONAZOLE 150 MG/1
150 TABLET ORAL EVERY OTHER DAY
COMMUNITY
Start: 2025-05-28

## 2025-06-02 RX ORDER — CLOPIDOGREL BISULFATE 75 MG/1
75 TABLET ORAL DAILY
Qty: 90 TABLET | Refills: 3 | Status: SHIPPED | OUTPATIENT
Start: 2025-06-02

## 2025-06-02 NOTE — LETTER
2025     Christi Coppola MD  [address] [address]    Patient: Idalia Okeefe   YOB: 1955   Date of Visit: 2025     Dear Christi Coppola MD:       Thank you for referring Idalia Okeefe to me for evaluation. Below are the relevant portions of my assessment and plan of care.    If you have questions, please do not hesitate to call me. I look forward to following Idlaia along with you.         Sincerely,        Johan Bunch MD        CC: MD Julio C Hamm Jamie D, MD  25 1530  Sign when Signing Visit  Date of Office Visit: 25  Encounter Provider: Johan Bunch MD  Place of Service: [facility] CARDIOLOGY  Patient Name: Idalia Okeefe  :1955    Chief Complaint   Patient presents with   • Coronary Artery Disease   :     HPI:     Ms. Okeefe is 70 y.o. and presents today in follow up.  I have reviewed prior notes and there are no changes except for any new updates described below. I have also reviewed any information entered into the medical record by the patient or by ancillary staff.     She has a long history of insulin-dependent type 2 diabetes. She has a history of acute renal failure which briefly required hemodialysis in the past.  She has a left upper extremity AV fistula that she has never had to use.  She has stage III chronic renal insufficiency; she follows with Dr Horner. She has hypertension.  She has hyperlipidemia and is intolerant of statins.  She is a former smoker, having quit in .  Her mother had valvular heart disease and required open heart surgery for a valve replacement.  Her father had his first MI at age 45.  2 of her 3 brothers have been diagnosed with heart disease in their early 60s.    She presented in 2021 with dyspnea and chest discomfort. She underwent coronary angiography and was found to have severe disease of the proximal LAD, which was treated with a drug  eluting stent. There was mild-moderate disease elsewhere.     Despite revascularization, she still reported fatigue and dyspnea. We tried adjusting medications which did not help. She was never able to do cardiac rehab. I felt that most of her symptoms were due to deconditioning.     She denies chest pain; her fatigue is unchanged. She's actually doing a lot of hard yard work without issue.    Past Medical History:   Diagnosis Date   • Acquired solitary kidney    • Acute renal failure (ARF)     requiring temporary dialysis   • APC (atrial premature contractions) 09/01/2023   • AV fistula     LUE, never has been used   • CAD (coronary artery disease) 08/2021    90% prox LAD s/p 3x18mm Xience, LI of LCx, 40-50% OM, 30% mid/50% distal RCA   • CKD (chronic kidney disease) stage 3, GFR 30-59 ml/min    • Diabetic neuropathy    • Hypertension    • Mixed hyperlipidemia    • Morbid obesity    • Type 2 diabetes mellitus    • Vitamin D deficiency        Past Surgical History:   Procedure Laterality Date   • CARDIAC CATHETERIZATION N/A 8/30/2021    Left main small no disease, LAD 90% proximal lesion, circumflex luminal irregularities proximally, 40-50% ostial OM1 lesion, and RCA 20% proximal, 30% mid, and 50% distal.   • CARDIAC CATHETERIZATION N/A 8/30/2021    Procedure: Left heart cath NO LV GRAM;  Surgeon: Albaro Woody MD;  Location: [facility] CATH INVASIVE LOCATION;  Service: Cardiovascular;  Laterality: N/A;   • CARDIAC CATHETERIZATION N/A 8/30/2021    Procedure: Stent NEDA coronary;  Surgeon: Albaro Woody MD;  Location: [facility] CATH INVASIVE LOCATION;  Service: Cardiovascular;  Laterality: N/A;   • CARDIAC CATHETERIZATION N/A 8/30/2021    Procedure: Optical Coherent Tomography;  Surgeon: Albaro Woody MD;  Location: [facility] CATH INVASIVE LOCATION;  Service: Cardiovascular;  Laterality: N/A;   • CORONARY STENT PLACEMENT  08/30/2021    3.0 x 18 mm Xience Carmen drug-eluting stent to the proximal LAD   • HYSTERECTOMY      • NEPHRECTOMY PARTIAL Left    • TONSILLECTOMY AND ADENOIDECTOMY         Social History     Socioeconomic History   • Marital status:    Tobacco Use   • Smoking status: Former     Current packs/day: 0.00     Types: Cigarettes     Quit date: 2001     Years since quittin.7   • Smokeless tobacco: Never   Vaping Use   • Vaping status: Never Used   Substance and Sexual Activity   • Alcohol use: Never   • Drug use: Never   • Sexual activity: Defer       Family History   Problem Relation Age of Onset   • Colon cancer Mother    • Heart valve disorder Mother         had a valve replacement   • Coronary artery disease Father         first MI age 45   • Coronary artery disease Brother         CABG in his early 60s   • Coronary artery disease Brother         PCIs in 60s       Review of Systems   Constitutional: Positive for malaise/fatigue.   Cardiovascular:  Positive for dyspnea on exertion. Negative for chest pain.   Respiratory:  Negative for shortness of breath.    Musculoskeletal:  Positive for back pain, joint pain and myalgias.   Neurological:  Positive for excessive daytime sleepiness, numbness and paresthesias.   Psychiatric/Behavioral:  Positive for depression.    All other systems reviewed and are negative.      Allergies   Allergen Reactions   • Barium-Containing Compounds Other (See Comments)     Low kidney function    Other reaction(s): Other (See Comments)   Low kidney function   Low kidney function    Other reaction(s): Other (See Comments)   Low kidney function   Low kidney function   Other reaction(s): Other (See Comments)   Low kidney function   Low kidney function   • Contrast Dye (Echo Or Unknown Ct/Mr) Other (See Comments)     Pt has kidney issues.    • Iodinated Contrast Media Other (See Comments)     Other reaction(s): Other (See Comments)    Pt has kidney issues.    Other reaction(s): Other (See Comments)   Pt has kidney issues.    Other reaction(s): Other (See Comments)   Pt has  kidney issues.     Other reaction(s): Other (See Comments)   Pt has kidney issues.    Other reaction(s): Other (See Comments)   Pt has kidney issues.    Other reaction(s): Other (See Comments)   Pt has kidney issues.    Other reaction(s): Other (See Comments)   Pt has kidney issues.   • Levofloxacin Hives   • Vancomycin Rash         Current Outpatient Medications:   •  DULoxetine (CYMBALTA) 60 MG capsule, Daily., Disp: , Rfl:   •  famotidine (PEPCID) 20 MG tablet, Take 1 tablet by mouth Daily., Disp: , Rfl:   •  fluconazole (DIFLUCAN) 150 MG tablet, Take 1 tablet by mouth Every Other Day., Disp: , Rfl:   •  Insulin Lispro Prot & Lispro (humaLOG 75-25) (75-25) 100 UNIT/ML suspension pen-injector pen, INJECT 50 UNITS UNDER THE SKIN TWICE A DAY BEFORE MEALS, Disp: , Rfl:   •  Jardiance 25 MG tablet tablet, Take 1 tablet by mouth Daily., Disp: , Rfl:   •  losartan (COZAAR) 100 MG tablet, Take 1 tablet by mouth once daily (Patient taking differently: Take 0.5 tablets by mouth Daily.), Disp: 90 tablet, Rfl: 3  •  metoprolol tartrate (LOPRESSOR) 25 MG tablet, Take 1 tablet by mouth once daily, Disp: 90 tablet, Rfl: 0  •  Mounjaro 10 MG/0.5ML solution auto-injector, Inject 10 mg under the skin into the appropriate area as directed Every 7 (Seven) Days., Disp: , Rfl:   •  nitroglycerin (NITROSTAT) 0.4 MG SL tablet, DISSOLVE ONE TABLET UNDER THE TONGUE EVERY 5 MINUTES AS NEEDED FOR CHEST PAIN.  DO NOT EXCEED A TOTAL OF 3 DOSES IN 15 MINUTES, Disp: 25 tablet, Rfl: 0  •  prasugrel (EFFIENT) 10 MG tablet, TAKE 1 TABLET BY MOUTH DAILY, Disp: 90 tablet, Rfl: 1  •  vitamin D (ERGOCALCIFEROL) 1.25 MG (41715 UT) capsule capsule, Take 1 capsule by mouth 1 (One) Time Per Week., Disp: , Rfl:   •  dicyclomine (BENTYL) 20 MG tablet, Take 1 tablet by mouth Every 6 (Six) Hours. (Patient not taking: Reported on 6/2/2025), Disp: 20 tablet, Rfl: 0  •  ketoconazole (NIZORAL) 2 % cream, Apply 1 application  topically to the appropriate area as  "directed 2 (Two) Times a Day. (Patient not taking: Reported on 6/2/2025), Disp: , Rfl:   •  ondansetron ODT (ZOFRAN-ODT) 4 MG disintegrating tablet, Place 1 tablet on the tongue Every 8 (Eight) Hours As Needed for Nausea or Vomiting. (Patient not taking: Reported on 6/2/2025), Disp: 15 tablet, Rfl: 0      Objective:     Vitals:    06/02/25 1448   BP: 110/62   BP Location: Right arm   Pulse: 82   Weight: 98.8 kg (217 lb 12.8 oz)   Height: 160 cm (63\")       Body mass index is 38.58 kg/m².    Vitals reviewed.   Constitutional:       Appearance: Not in distress.      Comments: Obese   Eyes:      Conjunctiva/sclera: Conjunctivae normal.   HENT:      Head: Normocephalic.      Nose: Nose normal.   Neck:      Vascular: JVD normal.   Pulmonary:      Effort: Pulmonary effort is normal.      Breath sounds: Normal breath sounds.   Cardiovascular:      Normal rate. Regular rhythm.      Murmurs: There is no murmur.   Pulses:     Intact distal pulses.   Edema:     Peripheral edema absent.   Abdominal:      Palpations: Abdomen is soft.      Tenderness: There is no abdominal tenderness.      Comments: Obesity limits abdominal exam   Musculoskeletal: Normal range of motion.      Cervical back: Normal range of motion. Skin:     General: Skin is warm and dry.   Neurological:      General: No focal deficit present.      Mental Status: Alert and oriented to person, place, and time.      Cranial Nerves: No cranial nerve deficit.   Psychiatric:         Behavior: Behavior normal.         Thought Content: Thought content normal.         Judgment: Judgment normal.         ECG 12 Lead    Date/Time: 6/2/2025 3:08 PM  Performed by: Johan Bunch MD    Authorized by: Johan Bunch MD  Comparison: compared with previous ECG   Similar to previous ECG  Rhythm: sinus rhythm  Conduction: conduction normal  ST Segments: ST segments normal  T Waves: T waves normal  QRS axis: normal  Other: no other findings    Clinical impression: normal ECG        "   Assessment:       Diagnosis Plan   1. Coronary artery disease involving native coronary artery of native heart without angina pectoris  ECG 12 Lead      2. Mixed hyperlipidemia        3. Primary hypertension        4. Stage 3b chronic kidney disease        5. APC (atrial premature contractions)           Plan:     1/2. Coronary Artery Disease  Subjective - Objective  • There has been a previous stent procedure using NEDA 8/2021  • Current antiplatelet therapy includes prasugrel 10 mg    She's presently on prasugrel alone; I'm going to switch that to clopidogrel alone.    I recommend that she be on a PCSK9i; I'll defer this to her PCP or endocrinologist.    3/4. Her BP is within goal.    5. These are presently a non-issue.      Sincerely,       Johan Bunch MD

## 2025-06-02 NOTE — PROGRESS NOTES
Date of Office Visit: 25  Encounter Provider: Johan Bunch MD  Place of Service: [facility] CARDIOLOGY  Patient Name: Idalia Okeefe  :1955    Chief Complaint   Patient presents with    Coronary Artery Disease   :     HPI:     Ms. Okeefe is 70 y.o. and presents today in follow up.  I have reviewed prior notes and there are no changes except for any new updates described below. I have also reviewed any information entered into the medical record by the patient or by ancillary staff.     She has a long history of insulin-dependent type 2 diabetes. She has a history of acute renal failure which briefly required hemodialysis in the past.  She has a left upper extremity AV fistula that she has never had to use.  She has stage III chronic renal insufficiency; she follows with Dr Horner. She has hypertension.  She has hyperlipidemia and is intolerant of statins.  She is a former smoker, having quit in .  Her mother had valvular heart disease and required open heart surgery for a valve replacement.  Her father had his first MI at age 45.  2 of her 3 brothers have been diagnosed with heart disease in their early 60s.    She presented in 2021 with dyspnea and chest discomfort. She underwent coronary angiography and was found to have severe disease of the proximal LAD, which was treated with a drug eluting stent. There was mild-moderate disease elsewhere.     Despite revascularization, she still reported fatigue and dyspnea. We tried adjusting medications which did not help. She was never able to do cardiac rehab. I felt that most of her symptoms were due to deconditioning.     She denies chest pain; her fatigue is unchanged. She's actually doing a lot of hard yard work without issue.    Past Medical History:   Diagnosis Date    Acquired solitary kidney     Acute renal failure (ARF)     requiring temporary dialysis    APC (atrial premature contractions) 2023    AV  fistula     LUE, never has been used    CAD (coronary artery disease) 2021    90% prox LAD s/p 3x18mm Xience, LI of LCx, 40-50% OM, 30% mid/50% distal RCA    CKD (chronic kidney disease) stage 3, GFR 30-59 ml/min     Diabetic neuropathy     Hypertension     Mixed hyperlipidemia     Morbid obesity     Type 2 diabetes mellitus     Vitamin D deficiency        Past Surgical History:   Procedure Laterality Date    CARDIAC CATHETERIZATION N/A 2021    Left main small no disease, LAD 90% proximal lesion, circumflex luminal irregularities proximally, 40-50% ostial OM1 lesion, and RCA 20% proximal, 30% mid, and 50% distal.    CARDIAC CATHETERIZATION N/A 2021    Procedure: Left heart cath NO LV GRAM;  Surgeon: Albaro Woody MD;  Location:  [facility] CATH INVASIVE LOCATION;  Service: Cardiovascular;  Laterality: N/A;    CARDIAC CATHETERIZATION N/A 2021    Procedure: Stent NEDA coronary;  Surgeon: Albaro Woody MD;  [facility] C[facility];  Service: Cardiovascular;  Laterality: N/A;    CARDIAC CATHETERIZATION N/A 2021    Procedure: Optical Coherent Tomography;  Surgeon: Albaro Woody MD;  Location:  [facility] CATH INVASIVE LOCATION;  Service: Cardiovascular;  Laterality: N/A;    CORONARY STENT PLACEMENT  2021    3.0 x 18 mm Xience Carmen drug-eluting stent to the proximal LAD    HYSTERECTOMY      NEPHRECTOMY PARTIAL Left     TONSILLECTOMY AND ADENOIDECTOMY         Social History     Socioeconomic History    Marital status:    Tobacco Use    Smoking status: Former     Current packs/day: 0.00     Types: Cigarettes     Quit date: 2001     Years since quittin.7    Smokeless tobacco: Never   Vaping Use    Vaping status: Never Used   Substance and Sexual Activity    Alcohol use: Never    Drug use: Never    Sexual activity: Defer       Family History   Problem Relation Age of Onset    Colon cancer Mother     Heart valve disorder Mother         had a valve replacement     Coronary artery disease Father         first MI age 45    Coronary artery disease Brother         CABG in his early 60s    Coronary artery disease Brother         PCIs in 60s       Review of Systems   Constitutional: Positive for malaise/fatigue.   Cardiovascular:  Positive for dyspnea on exertion. Negative for chest pain.   Respiratory:  Negative for shortness of breath.    Musculoskeletal:  Positive for back pain, joint pain and myalgias.   Neurological:  Positive for excessive daytime sleepiness, numbness and paresthesias.   Psychiatric/Behavioral:  Positive for depression.    All other systems reviewed and are negative.      Allergies   Allergen Reactions    Barium-Containing Compounds Other (See Comments)     Low kidney function    Other reaction(s): Other (See Comments)   Low kidney function   Low kidney function    Other reaction(s): Other (See Comments)   Low kidney function   Low kidney function   Other reaction(s): Other (See Comments)   Low kidney function   Low kidney function    Contrast Dye (Echo Or Unknown Ct/Mr) Other (See Comments)     Pt has kidney issues.     Iodinated Contrast Media Other (See Comments)     Other reaction(s): Other (See Comments)    Pt has kidney issues.    Other reaction(s): Other (See Comments)   Pt has kidney issues.    Other reaction(s): Other (See Comments)   Pt has kidney issues.     Other reaction(s): Other (See Comments)   Pt has kidney issues.    Other reaction(s): Other (See Comments)   Pt has kidney issues.    Other reaction(s): Other (See Comments)   Pt has kidney issues.    Other reaction(s): Other (See Comments)   Pt has kidney issues.    Levofloxacin Hives    Vancomycin Rash         Current Outpatient Medications:     DULoxetine (CYMBALTA) 60 MG capsule, Daily., Disp: , Rfl:     famotidine (PEPCID) 20 MG tablet, Take 1 tablet by mouth Daily., Disp: , Rfl:     fluconazole (DIFLUCAN) 150 MG tablet, Take 1 tablet by mouth Every Other Day., Disp: , Rfl:     Insulin  "Lispro Prot & Lispro (humaLOG 75-25) (75-25) 100 UNIT/ML suspension pen-injector pen, INJECT 50 UNITS UNDER THE SKIN TWICE A DAY BEFORE MEALS, Disp: , Rfl:     Jardiance 25 MG tablet tablet, Take 1 tablet by mouth Daily., Disp: , Rfl:     losartan (COZAAR) 100 MG tablet, Take 1 tablet by mouth once daily (Patient taking differently: Take 0.5 tablets by mouth Daily.), Disp: 90 tablet, Rfl: 3    metoprolol tartrate (LOPRESSOR) 25 MG tablet, Take 1 tablet by mouth once daily, Disp: 90 tablet, Rfl: 0    Mounjaro 10 MG/0.5ML solution auto-injector, Inject 10 mg under the skin into the appropriate area as directed Every 7 (Seven) Days., Disp: , Rfl:     nitroglycerin (NITROSTAT) 0.4 MG SL tablet, DISSOLVE ONE TABLET UNDER THE TONGUE EVERY 5 MINUTES AS NEEDED FOR CHEST PAIN.  DO NOT EXCEED A TOTAL OF 3 DOSES IN 15 MINUTES, Disp: 25 tablet, Rfl: 0    prasugrel (EFFIENT) 10 MG tablet, TAKE 1 TABLET BY MOUTH DAILY, Disp: 90 tablet, Rfl: 1    vitamin D (ERGOCALCIFEROL) 1.25 MG (69148 UT) capsule capsule, Take 1 capsule by mouth 1 (One) Time Per Week., Disp: , Rfl:     dicyclomine (BENTYL) 20 MG tablet, Take 1 tablet by mouth Every 6 (Six) Hours. (Patient not taking: Reported on 6/2/2025), Disp: 20 tablet, Rfl: 0    ketoconazole (NIZORAL) 2 % cream, Apply 1 application  topically to the appropriate area as directed 2 (Two) Times a Day. (Patient not taking: Reported on 6/2/2025), Disp: , Rfl:     ondansetron ODT (ZOFRAN-ODT) 4 MG disintegrating tablet, Place 1 tablet on the tongue Every 8 (Eight) Hours As Needed for Nausea or Vomiting. (Patient not taking: Reported on 6/2/2025), Disp: 15 tablet, Rfl: 0      Objective:     Vitals:    06/02/25 1448   BP: 110/62   BP Location: Right arm   Pulse: 82   Weight: 98.8 kg (217 lb 12.8 oz)   Height: 160 cm (63\")       Body mass index is 38.58 kg/m².    Vitals reviewed.   Constitutional:       Appearance: Not in distress.      Comments: Obese   Eyes:      Conjunctiva/sclera: Conjunctivae " normal.   HENT:      Head: Normocephalic.      Nose: Nose normal.   Neck:      Vascular: JVD normal.   Pulmonary:      Effort: Pulmonary effort is normal.      Breath sounds: Normal breath sounds.   Cardiovascular:      Normal rate. Regular rhythm.      Murmurs: There is no murmur.   Pulses:     Intact distal pulses.   Edema:     Peripheral edema absent.   Abdominal:      Palpations: Abdomen is soft.      Tenderness: There is no abdominal tenderness.      Comments: Obesity limits abdominal exam   Musculoskeletal: Normal range of motion.      Cervical back: Normal range of motion. Skin:     General: Skin is warm and dry.   Neurological:      General: No focal deficit present.      Mental Status: Alert and oriented to person, place, and time.      Cranial Nerves: No cranial nerve deficit.   Psychiatric:         Behavior: Behavior normal.         Thought Content: Thought content normal.         Judgment: Judgment normal.         ECG 12 Lead    Date/Time: 6/2/2025 3:08 PM  Performed by: Johan Bunch MD    Authorized by: Johan Bunch MD  Comparison: compared with previous ECG   Similar to previous ECG  Rhythm: sinus rhythm  Conduction: conduction normal  ST Segments: ST segments normal  T Waves: T waves normal  QRS axis: normal  Other: no other findings    Clinical impression: normal ECG          Assessment:       Diagnosis Plan   1. Coronary artery disease involving native coronary artery of native heart without angina pectoris  ECG 12 Lead      2. Mixed hyperlipidemia        3. Primary hypertension        4. Stage 3b chronic kidney disease        5. APC (atrial premature contractions)           Plan:     1/2. Coronary Artery Disease  Subjective - Objective   There has been a previous stent procedure using NEDA 8/2021   Current antiplatelet therapy includes prasugrel 10 mg    She's presently on prasugrel alone; I'm going to switch that to clopidogrel alone.    I recommend that she be on a PCSK9i; I'll defer this to  her PCP or endocrinologist.    3/4. Her BP is within goal.    5. These are presently a non-issue.      Sincerely,       Johan Bunch MD

## 2025-06-16 ENCOUNTER — TRANSCRIBE ORDERS (OUTPATIENT)
Dept: LAB | Facility: HOSPITAL | Age: 70
End: 2025-06-16
Payer: MEDICARE

## 2025-06-16 DIAGNOSIS — N18.30 STAGE 3 CHRONIC KIDNEY DISEASE, UNSPECIFIED WHETHER STAGE 3A OR 3B CKD: Primary | ICD-10-CM

## 2025-06-16 DIAGNOSIS — E55.9 VITAMIN D DEFICIENCY: ICD-10-CM

## 2025-06-18 ENCOUNTER — LAB (OUTPATIENT)
Dept: LAB | Facility: HOSPITAL | Age: 70
End: 2025-06-18
Payer: MEDICARE

## 2025-06-18 DIAGNOSIS — E55.9 VITAMIN D DEFICIENCY: ICD-10-CM

## 2025-06-18 DIAGNOSIS — N18.30 STAGE 3 CHRONIC KIDNEY DISEASE, UNSPECIFIED WHETHER STAGE 3A OR 3B CKD: ICD-10-CM

## 2025-06-18 LAB
25(OH)D3 SERPL-MCNC: 86.2 NG/ML (ref 30–100)
ALBUMIN SERPL-MCNC: 3.9 G/DL (ref 3.5–5.2)
ANION GAP SERPL CALCULATED.3IONS-SCNC: 13 MMOL/L (ref 5–15)
BASOPHILS # BLD AUTO: 0.07 10*3/MM3 (ref 0–0.2)
BASOPHILS NFR BLD AUTO: 0.9 % (ref 0–1.5)
BILIRUB UR QL STRIP: NEGATIVE
BUN SERPL-MCNC: 21 MG/DL (ref 8–23)
BUN/CREAT SERPL: 12.8 (ref 7–25)
CALCIUM SPEC-SCNC: 9.7 MG/DL (ref 8.6–10.5)
CHLORIDE SERPL-SCNC: 103 MMOL/L (ref 98–107)
CLARITY UR: ABNORMAL
CO2 SERPL-SCNC: 21 MMOL/L (ref 22–29)
COLOR UR: ABNORMAL
CREAT SERPL-MCNC: 1.64 MG/DL (ref 0.57–1)
CREAT UR-MCNC: 311.4 MG/DL
DEPRECATED RDW RBC AUTO: 41.3 FL (ref 37–54)
EGFRCR SERPLBLD CKD-EPI 2021: 33.3 ML/MIN/1.73
EOSINOPHIL # BLD AUTO: 0.23 10*3/MM3 (ref 0–0.4)
EOSINOPHIL NFR BLD AUTO: 2.8 % (ref 0.3–6.2)
ERYTHROCYTE [DISTWIDTH] IN BLOOD BY AUTOMATED COUNT: 13.5 % (ref 12.3–15.4)
GLUCOSE SERPL-MCNC: 118 MG/DL (ref 65–99)
GLUCOSE UR STRIP-MCNC: ABNORMAL MG/DL
HCT VFR BLD AUTO: 43.4 % (ref 34–46.6)
HGB BLD-MCNC: 14.4 G/DL (ref 12–15.9)
HGB UR QL STRIP.AUTO: NEGATIVE
IMM GRANULOCYTES # BLD AUTO: 0.03 10*3/MM3 (ref 0–0.05)
IMM GRANULOCYTES NFR BLD AUTO: 0.4 % (ref 0–0.5)
KETONES UR QL STRIP: ABNORMAL
LEUKOCYTE ESTERASE UR QL STRIP.AUTO: NEGATIVE
LYMPHOCYTES # BLD AUTO: 3.4 10*3/MM3 (ref 0.7–3.1)
LYMPHOCYTES NFR BLD AUTO: 41.5 % (ref 19.6–45.3)
MCH RBC QN AUTO: 28 PG (ref 26.6–33)
MCHC RBC AUTO-ENTMCNC: 33.2 G/DL (ref 31.5–35.7)
MCV RBC AUTO: 84.3 FL (ref 79–97)
MONOCYTES # BLD AUTO: 0.75 10*3/MM3 (ref 0.1–0.9)
MONOCYTES NFR BLD AUTO: 9.1 % (ref 5–12)
NEUTROPHILS NFR BLD AUTO: 3.72 10*3/MM3 (ref 1.7–7)
NEUTROPHILS NFR BLD AUTO: 45.3 % (ref 42.7–76)
NITRITE UR QL STRIP: NEGATIVE
NRBC BLD AUTO-RTO: 0 /100 WBC (ref 0–0.2)
PH UR STRIP.AUTO: 5.5 [PH] (ref 5–8)
PHOSPHATE SERPL-MCNC: 3 MG/DL (ref 2.5–4.5)
PLATELET # BLD AUTO: 261 10*3/MM3 (ref 140–450)
PMV BLD AUTO: 8.9 FL (ref 6–12)
POTASSIUM SERPL-SCNC: 4 MMOL/L (ref 3.5–5.2)
PROT ?TM UR-MCNC: 39.3 MG/DL
PROT UR QL STRIP: ABNORMAL
PROT/CREAT UR: 0.13 MG/G{CREAT}
PTH-INTACT SERPL-MCNC: 30.9 PG/ML (ref 15–65)
RBC # BLD AUTO: 5.15 10*6/MM3 (ref 3.77–5.28)
SODIUM SERPL-SCNC: 137 MMOL/L (ref 136–145)
SP GR UR STRIP: 1.03 (ref 1–1.03)
UROBILINOGEN UR QL STRIP: ABNORMAL
WBC NRBC COR # BLD AUTO: 8.2 10*3/MM3 (ref 3.4–10.8)

## 2025-06-18 PROCEDURE — 82306 VITAMIN D 25 HYDROXY: CPT

## 2025-06-18 PROCEDURE — 85025 COMPLETE CBC W/AUTO DIFF WBC: CPT

## 2025-06-18 PROCEDURE — 84156 ASSAY OF PROTEIN URINE: CPT

## 2025-06-18 PROCEDURE — 80069 RENAL FUNCTION PANEL: CPT

## 2025-06-18 PROCEDURE — 81001 URINALYSIS AUTO W/SCOPE: CPT

## 2025-06-18 PROCEDURE — 82570 ASSAY OF URINE CREATININE: CPT

## 2025-06-18 PROCEDURE — 36415 COLL VENOUS BLD VENIPUNCTURE: CPT

## 2025-06-18 PROCEDURE — 83970 ASSAY OF PARATHORMONE: CPT

## 2025-06-19 LAB
BACTERIA UR QL AUTO: NORMAL /HPF
HYALINE CASTS UR QL AUTO: NORMAL /LPF
RBC # UR STRIP: NORMAL /HPF
REF LAB TEST METHOD: NORMAL
SQUAMOUS #/AREA URNS HPF: NORMAL /HPF
WBC # UR STRIP: NORMAL /HPF

## 2025-07-01 RX ORDER — METOPROLOL TARTRATE 25 MG/1
25 TABLET, FILM COATED ORAL DAILY
Qty: 90 TABLET | Refills: 1 | Status: SHIPPED | OUTPATIENT
Start: 2025-07-01

## (undated) DEVICE — PK CATH CARD 40

## (undated) DEVICE — RADIFOCUS GLIDEWIRE: Brand: GLIDEWIRE

## (undated) DEVICE — GUIDE CATHETER: Brand: MACH1™

## (undated) DEVICE — GW HITORQUE/BAL MID/WT J W/HCOAT .014 3X190CM

## (undated) DEVICE — CATH DIAG CARD PERFORM JR4.0 BT 4F100CM

## (undated) DEVICE — NC TREK CORONARY DILATATION CATHETER 3.0 MM X 15 MM / RAPID-EXCHANGE: Brand: NC TREK

## (undated) DEVICE — CATH DIAG CARD PERFORMA JL3.5 BT 4F100CM

## (undated) DEVICE — CATH DIAG IMPULSE FR4 5F 100CM

## (undated) DEVICE — KT MANIFLD CARDIAC

## (undated) DEVICE — TREK CORONARY DILATATION CATHETER 3.0 MM X 12 MM / RAPID-EXCHANGE: Brand: TREK

## (undated) DEVICE — DEV INDEFLATOR P/N 580289

## (undated) DEVICE — CATH4F INF PIG 145Â° MOD 110CM: Brand: INFINITI

## (undated) DEVICE — TR BAND RADIAL ARTERY COMPRESSION DEVICE: Brand: TR BAND

## (undated) DEVICE — GW EMR FIX EXCHG J STD .035 3MM 260CM

## (undated) DEVICE — CATH DIAG IMPULSE FL3.5 5F 100CM

## (undated) DEVICE — KT CATH IMG DRAGONFLY C7 XR HC 2.7F 135CM

## (undated) DEVICE — GLIDESHEATH SLENDER STAINLESS STEEL KIT: Brand: GLIDESHEATH SLENDER